# Patient Record
Sex: MALE | Race: WHITE | NOT HISPANIC OR LATINO | Employment: FULL TIME | URBAN - NONMETROPOLITAN AREA
[De-identification: names, ages, dates, MRNs, and addresses within clinical notes are randomized per-mention and may not be internally consistent; named-entity substitution may affect disease eponyms.]

---

## 2017-08-24 ENCOUNTER — APPOINTMENT (OUTPATIENT)
Dept: RADIOLOGY | Facility: MEDICAL CENTER | Age: 47
End: 2017-08-24
Payer: COMMERCIAL

## 2017-08-24 ENCOUNTER — TRANSCRIBE ORDERS (OUTPATIENT)
Dept: RADIOLOGY | Facility: MEDICAL CENTER | Age: 47
End: 2017-08-24

## 2017-08-24 DIAGNOSIS — S29.019S THORACIC MYOFASCIAL STRAIN, SEQUELA: ICD-10-CM

## 2017-08-24 DIAGNOSIS — M54.30 SCIATICA, UNSPECIFIED LATERALITY: ICD-10-CM

## 2017-08-24 DIAGNOSIS — M47.9 OSTEOARTHRITIS OF SPINE, UNSPECIFIED SPINAL OSTEOARTHRITIS COMPLICATION STATUS, UNSPECIFIED SPINAL REGION: Primary | ICD-10-CM

## 2017-08-24 DIAGNOSIS — M47.9 OSTEOARTHRITIS OF SPINE, UNSPECIFIED SPINAL OSTEOARTHRITIS COMPLICATION STATUS, UNSPECIFIED SPINAL REGION: ICD-10-CM

## 2017-08-24 PROCEDURE — 72110 X-RAY EXAM L-2 SPINE 4/>VWS: CPT

## 2017-08-24 PROCEDURE — 72072 X-RAY EXAM THORAC SPINE 3VWS: CPT

## 2021-01-04 ENCOUNTER — APPOINTMENT (OUTPATIENT)
Dept: RADIOLOGY | Facility: CLINIC | Age: 51
End: 2021-01-04
Payer: COMMERCIAL

## 2021-01-04 ENCOUNTER — OFFICE VISIT (OUTPATIENT)
Dept: URGENT CARE | Facility: CLINIC | Age: 51
End: 2021-01-04
Payer: COMMERCIAL

## 2021-01-04 VITALS
WEIGHT: 156 LBS | HEART RATE: 90 BPM | HEIGHT: 66 IN | SYSTOLIC BLOOD PRESSURE: 160 MMHG | BODY MASS INDEX: 25.07 KG/M2 | TEMPERATURE: 98 F | DIASTOLIC BLOOD PRESSURE: 80 MMHG

## 2021-01-04 DIAGNOSIS — M25.511 ACUTE PAIN OF RIGHT SHOULDER: ICD-10-CM

## 2021-01-04 DIAGNOSIS — M75.22 BICIPITAL TENDINITIS, LEFT SHOULDER: Primary | ICD-10-CM

## 2021-01-04 PROCEDURE — G0382 LEV 3 HOSP TYPE B ED VISIT: HCPCS | Performed by: PHYSICIAN ASSISTANT

## 2021-01-04 PROCEDURE — 73030 X-RAY EXAM OF SHOULDER: CPT

## 2021-01-04 RX ORDER — PREDNISONE 20 MG/1
TABLET ORAL
Qty: 15 TABLET | Refills: 0 | Status: SHIPPED | OUTPATIENT
Start: 2021-01-04

## 2021-01-04 NOTE — PROGRESS NOTES
[facility]        NAME: Donny Bales is a 48 y o  male  : 1970    MRN: [id number]  DATE: 2021  TIME: 9:29 AM    Assessment and Plan   Bicipital tendinitis, left shoulder [M75 22]  1  Bicipital tendinitis, left shoulder  predniSONE 20 mg tablet    Ambulatory referral to Orthopedic Surgery   2  Acute pain of right shoulder  XR shoulder 2+ vw left         Patient Instructions     Start Prednisone as prescribed  Do not take Motrin, Advil or Aleve as these can cause increase stomach problem when taking Prednisone  Take Tylenol for pain  Follow up with orthopedics if no improvement  Tendinitis   WHAT YOU NEED TO KNOW:   Tendinitis is painful inflammation or breakdown of your tendons  It may also be called tendinopathy  Tendinitis often occurs in the knee, shoulder, ankle, hip, or elbow  DISCHARGE INSTRUCTIONS:   Medicines:   · Pain medicines  such as acetaminophen or NSAIDs may decrease swelling and pain or fever  These medicines are available without a doctor's order  Ask which medicine to take  Ask how much to take and when to take it  Follow directions  Acetaminophen and NSAIDs can cause liver or kidney damage if not taken correctly  If you take blood thinner medicine, always ask your healthcare provider if NSAIDs are safe for you  Always read the medicine label and follow the directions on it before using these medicine  · Take your medicine as directed  Contact your healthcare provider if you think your medicine is not helping or if you have side effects  Tell him if you are allergic to any medicine  Keep a list of the medicines, vitamins, and herbs you take  Include the amounts, and when and why you take them  Bring the list or the pill bottles to follow-up visits  Carry your medicine list with you in case of an emergency      Management:   · Rest  your tendon as directed to help it heal  Ask your healthcare provider if you need to stop putting weight on your affected area     · Ice  helps decrease swelling and pain  Ice may also help prevent tissue damage  Use an ice pack, or put crushed ice in a plastic bag  Cover it with a towel and place it on the affected area for 10 to 15 minutes every hour or as directed  · Support devices  such as a cane, splint, shoe insert, or brace may help reduce your pain  · Physical therapy  may be ordered by your healthcare provider  This may be used to teach you exercises to help improve movement and strength, and to decrease pain  You may also learn how to improve your posture, and how to lift or exercise correctly  Prevention:   · Stretch and warm up  before you exercise  · Exercise regularly  to strengthen the muscles around your joint  Ease into an exercise routine for the first 3 weeks to prevent another injury  Ask your healthcare provider about the best exercise plan for you  Rest fully between activities  · Use the right equipment  for sports and exercise  Wear braces or tape around weak joints as directed  Follow up with your healthcare provider as directed:  Write down your questions so you remember to ask them during your visits  Contact your healthcare provider if:   · You have increased pain even after you take medicine  · You have questions or concerns about your condition or care  Return to the emergency department if:   · You have increased redness over the joint, or swelling in the joint  · You suddenly cannot move your joint  © Copyright [address] Information is for End User's use only and may not be sold, redistributed or otherwise used for commercial purposes  All illustrations and images included in CareNotes® are the copyrighted property of [employer]  or [facility]   The above information is an  only  It is not intended as medical advice for individual conditions or treatments   Talk to your doctor, nurse or pharmacist before following any medical regimen to see if it is safe and effective for you  Follow up with PCP in 3-5 days  Proceed to  ER if symptoms worsen  Chief Complaint     Chief Complaint   Patient presents with    Shoulder Pain     left shoulder pain for several weeks ,woke up today worse   denies trauma          History of Present Illness       Patient presents with left shoulder pain for the past several weeks  Yesterday he was moving a computer desk and felt a pop in his left shoulder  Today, he woke with increased pain  Review of Systems   Review of Systems   Constitutional: Negative for fever  Skin: Negative for rash  Neurological: Negative for weakness and numbness  Current Medications       Current Outpatient Medications:     predniSONE 20 mg tablet, 3 tabs daily x 3 day, 2 tabs daily x 2 days, 1 tab daily x 2 days, Disp: 15 tablet, Rfl: 0    Current Allergies     Allergies as of 01/04/2021    (No Known Allergies)            The following portions of the patient's history were reviewed and updated as appropriate: allergies, current medications, past family history, past medical history, past social history, past surgical history and problem list      History reviewed  No pertinent past medical history  Past Surgical History:   Procedure Laterality Date    APPENDECTOMY      HAND FRACTURE REPAIR         No family history on file  Medications have been verified  Objective   /80   Pulse 90   Temp 98 °F (36 7 °C)   Ht 5' 6" (1 676 m)   Wt 70 8 kg (156 lb)   BMI 25 18 kg/m²   No LMP for male patient  Physical Exam     Physical Exam  Vitals signs and nursing note reviewed  Constitutional:       Appearance: Normal appearance  HENT:      Head: Normocephalic and atraumatic  Cardiovascular:      Rate and Rhythm: Normal rate and regular rhythm  Pulmonary:      Effort: Pulmonary effort is normal    Musculoskeletal:      Comments: Left shoulder without any rashes erythema or swelling    There is tenderness over the left biceps tenderness  There is increased pain when trying to raise his arm above shoulder level  Negative empty can sign  Skin:     General: Skin is warm  Neurological:      Mental Status: He is alert         Left shoulder x-ray -  degenerative changes

## 2022-07-14 ENCOUNTER — OFFICE VISIT (OUTPATIENT)
Dept: URGENT CARE | Facility: MEDICAL CENTER | Age: 52
End: 2022-07-14
Payer: COMMERCIAL

## 2022-07-14 VITALS — TEMPERATURE: 98.1 F | RESPIRATION RATE: 17 BRPM | OXYGEN SATURATION: 100 % | HEART RATE: 90 BPM

## 2022-07-14 DIAGNOSIS — L03.113 CELLULITIS OF RIGHT ARM: Primary | ICD-10-CM

## 2022-07-14 PROCEDURE — G0382 LEV 3 HOSP TYPE B ED VISIT: HCPCS | Performed by: PHYSICIAN ASSISTANT

## 2022-07-14 RX ORDER — SULFAMETHOXAZOLE AND TRIMETHOPRIM 800; 160 MG/1; MG/1
1 TABLET ORAL EVERY 12 HOURS SCHEDULED
Qty: 10 TABLET | Refills: 0 | Status: SHIPPED | OUTPATIENT
Start: 2022-07-14 | End: 2022-07-19

## 2022-07-14 NOTE — PROGRESS NOTES
[facility]        NAME: Kurtis Morejon is a 46 y o  male  : 1970    MRN: [id number]  DATE: 2022  TIME: 8:38 AM    Assessment and Plan   Cellulitis of right arm [L03 113]  1  Cellulitis of right arm  sulfamethoxazole-trimethoprim (BACTRIM DS) 800-160 mg per tablet      - Bactrim as directed  - ER for spreading redness, swelling, fevers    Patient Instructions       Follow up with PCP in 3-5 days  Proceed to  ER if symptoms worsen  Chief Complaint     Chief Complaint   Patient presents with    Spider Bite         History of Present Illness       Patient is a 22-year-old male who presents for possible spider bite x3 days  States he 1st noticed it after he was trimming hedges  Reports that there has been spreading redness in the area but denies fevers, swelling, purulent drainage  Has been using antibiotic ointment and hydrogen peroxide      Review of Systems   Review of Systems   Constitutional: Negative for chills and fever  Musculoskeletal: Negative for joint swelling  Skin: Positive for color change and wound  Neurological: Negative for dizziness  Current Medications       Current Outpatient Medications:     sulfamethoxazole-trimethoprim (BACTRIM DS) 800-160 mg per tablet, Take 1 tablet by mouth every 12 (twelve) hours for 5 days, Disp: 10 tablet, Rfl: 0    predniSONE 20 mg tablet, 3 tabs daily x 3 day, 2 tabs daily x 2 days, 1 tab daily x 2 days, Disp: 15 tablet, Rfl: 0    Current Allergies     Allergies as of 2022    (No Known Allergies)            The following portions of the patient's history were reviewed and updated as appropriate: allergies, current medications, past family history, past medical history, past social history, past surgical history and problem list      No past medical history on file  Past Surgical History:   Procedure Laterality Date    APPENDECTOMY      HAND FRACTURE REPAIR         No family history on file        Medications have been verified  Objective   Pulse 90   Temp 98 1 °F (36 7 °C)   Resp 17   SpO2 100%        Physical Exam     Physical Exam  Constitutional:       General: He is not in acute distress  Appearance: He is not ill-appearing or toxic-appearing  Cardiovascular:      Rate and Rhythm: Normal rate  Pulmonary:      Effort: Pulmonary effort is normal    Skin:     Comments: Erythematous area noted to right forearm  Minimally tender to palpation  No edema or warmth or purulent drainage   Neurological:      Mental Status: He is alert     Psychiatric:         Mood and Affect: Mood normal          Behavior: Behavior normal

## 2022-07-14 NOTE — LETTER
July 14, 2022     Patient: Stefanie Alvarez   YOB: 1970   Date of Visit: 7/14/2022       To Whom it May Concern:    Gail Mathew was seen in my clinic on 7/14/2022  If you have any questions or concerns, please don't hesitate to call  Sincerely,          Va Basilio PA-C        CC: Mikaela Renteria

## 2023-02-01 ENCOUNTER — OFFICE VISIT (OUTPATIENT)
Dept: URGENT CARE | Facility: MEDICAL CENTER | Age: 53
End: 2023-02-01

## 2023-02-01 ENCOUNTER — HOSPITAL ENCOUNTER (EMERGENCY)
Facility: HOSPITAL | Age: 53
Discharge: HOME | End: 2023-02-01
Attending: EMERGENCY MEDICINE

## 2023-02-01 VITALS
DIASTOLIC BLOOD PRESSURE: 96 MMHG | SYSTOLIC BLOOD PRESSURE: 164 MMHG | RESPIRATION RATE: 18 BRPM | TEMPERATURE: 98.9 F | HEART RATE: 103 BPM | WEIGHT: 160 LBS | HEIGHT: 66 IN | BODY MASS INDEX: 25.71 KG/M2 | OXYGEN SATURATION: 99 %

## 2023-02-01 VITALS
OXYGEN SATURATION: 97 % | DIASTOLIC BLOOD PRESSURE: 82 MMHG | SYSTOLIC BLOOD PRESSURE: 172 MMHG | RESPIRATION RATE: 20 BRPM | TEMPERATURE: 99.4 F | HEART RATE: 87 BPM | WEIGHT: 160 LBS | BODY MASS INDEX: 25.82 KG/M2

## 2023-02-01 DIAGNOSIS — E86.0 DEHYDRATION: ICD-10-CM

## 2023-02-01 DIAGNOSIS — R03.0 ELEVATED BLOOD PRESSURE READING: ICD-10-CM

## 2023-02-01 DIAGNOSIS — A08.4 VIRAL GASTROENTERITIS: Primary | ICD-10-CM

## 2023-02-01 DIAGNOSIS — R19.7 DIARRHEA, UNSPECIFIED TYPE: Primary | ICD-10-CM

## 2023-02-01 DIAGNOSIS — R53.1 WEAKNESS: ICD-10-CM

## 2023-02-01 LAB
ALBUMIN SERPL BCP-MCNC: 4.7 G/DL (ref 3.5–5)
ALP SERPL-CCNC: 69 U/L (ref 34–104)
ALT SERPL W P-5'-P-CCNC: 33 U/L (ref 7–52)
ANION GAP SERPL CALCULATED.3IONS-SCNC: 13 MMOL/L (ref 4–13)
AST SERPL W P-5'-P-CCNC: 32 U/L (ref 13–39)
BACTERIA UR QL AUTO: NORMAL /HPF
BASOPHILS # BLD AUTO: 0.04 THOUSANDS/ÂΜL (ref 0–0.1)
BASOPHILS NFR BLD AUTO: 1 % (ref 0–1)
BILIRUB SERPL-MCNC: 0.41 MG/DL (ref 0.2–1)
BILIRUB UR QL STRIP: NEGATIVE
BUN SERPL-MCNC: 11 MG/DL (ref 5–25)
CALCIUM SERPL-MCNC: 9.1 MG/DL (ref 8.4–10.2)
CHLORIDE SERPL-SCNC: 96 MMOL/L (ref 96–108)
CLARITY UR: CLEAR
CO2 SERPL-SCNC: 22 MMOL/L (ref 21–32)
COLOR UR: YELLOW
CREAT SERPL-MCNC: 0.59 MG/DL (ref 0.6–1.3)
EOSINOPHIL # BLD AUTO: 0.22 THOUSAND/ÂΜL (ref 0–0.61)
EOSINOPHIL NFR BLD AUTO: 3 % (ref 0–6)
ERYTHROCYTE [DISTWIDTH] IN BLOOD BY AUTOMATED COUNT: 12.9 % (ref 11.6–15.1)
GFR SERPL CREATININE-BSD FRML MDRD: 116 ML/MIN/1.73SQ M
GLUCOSE SERPL-MCNC: 89 MG/DL (ref 65–140)
GLUCOSE UR STRIP-MCNC: NEGATIVE MG/DL
HCT VFR BLD AUTO: 47 % (ref 36.5–49.3)
HGB BLD-MCNC: 16.4 G/DL (ref 12–17)
HGB UR QL STRIP.AUTO: ABNORMAL
IMM GRANULOCYTES # BLD AUTO: 0.02 THOUSAND/UL (ref 0–0.2)
IMM GRANULOCYTES NFR BLD AUTO: 0 % (ref 0–2)
KETONES UR STRIP-MCNC: ABNORMAL MG/DL
LEUKOCYTE ESTERASE UR QL STRIP: NEGATIVE
LYMPHOCYTES # BLD AUTO: 2.72 THOUSANDS/ÂΜL (ref 0.6–4.47)
LYMPHOCYTES NFR BLD AUTO: 32 % (ref 14–44)
MCH RBC QN AUTO: 31.2 PG (ref 26.8–34.3)
MCHC RBC AUTO-ENTMCNC: 34.9 G/DL (ref 31.4–37.4)
MCV RBC AUTO: 89 FL (ref 82–98)
MONOCYTES # BLD AUTO: 0.91 THOUSAND/ÂΜL (ref 0.17–1.22)
MONOCYTES NFR BLD AUTO: 11 % (ref 4–12)
NEUTROPHILS # BLD AUTO: 4.67 THOUSANDS/ÂΜL (ref 1.85–7.62)
NEUTS SEG NFR BLD AUTO: 53 % (ref 43–75)
NITRITE UR QL STRIP: NEGATIVE
NON-SQ EPI CELLS URNS QL MICRO: NORMAL /HPF
NRBC BLD AUTO-RTO: 0 /100 WBCS
PH UR STRIP.AUTO: 6 [PH]
PLATELET # BLD AUTO: 331 THOUSANDS/UL (ref 149–390)
PMV BLD AUTO: 8.6 FL (ref 8.9–12.7)
POTASSIUM SERPL-SCNC: 4.1 MMOL/L (ref 3.5–5.3)
PROT SERPL-MCNC: 7.7 G/DL (ref 6.4–8.4)
PROT UR STRIP-MCNC: NEGATIVE MG/DL
RBC # BLD AUTO: 5.26 MILLION/UL (ref 3.88–5.62)
RBC #/AREA URNS AUTO: NORMAL /HPF
SODIUM SERPL-SCNC: 131 MMOL/L (ref 135–147)
SP GR UR STRIP.AUTO: 1.02 (ref 1–1.03)
UROBILINOGEN UR QL STRIP.AUTO: 0.2 E.U./DL
WBC # BLD AUTO: 8.58 THOUSAND/UL (ref 4.31–10.16)
WBC #/AREA URNS AUTO: NORMAL /HPF

## 2023-02-01 RX ORDER — ONDANSETRON 2 MG/ML
4 INJECTION INTRAMUSCULAR; INTRAVENOUS ONCE
Status: COMPLETED | OUTPATIENT
Start: 2023-02-01 | End: 2023-02-01

## 2023-02-01 RX ORDER — ONDANSETRON 4 MG/1
4 TABLET, ORALLY DISINTEGRATING ORAL EVERY 6 HOURS PRN
Qty: 20 TABLET | Refills: 0 | Status: SHIPPED | OUTPATIENT
Start: 2023-02-01

## 2023-02-01 RX ADMIN — SODIUM CHLORIDE 1000 ML: 0.9 INJECTION, SOLUTION INTRAVENOUS at 20:45

## 2023-02-01 RX ADMIN — SODIUM CHLORIDE 1000 ML: 0.9 INJECTION, SOLUTION INTRAVENOUS at 22:15

## 2023-02-01 RX ADMIN — ONDANSETRON HYDROCHLORIDE 4 MG: 2 INJECTION, SOLUTION INTRAVENOUS at 20:45

## 2023-02-01 RX ADMIN — SODIUM CHLORIDE 1000 ML: 0.9 INJECTION, SOLUTION INTRAVENOUS at 22:14

## 2023-02-01 NOTE — PROGRESS NOTES
[facility]        NAME: Diana Hernandez is a 46 y o  male  : 1970    MRN: [id number]  DATE: 2023  TIME: 6:32 PM    Assessment and Plan   Diarrhea, unspecified type [R19 7]  1  Diarrhea, unspecified type  Transfer to other facility      2  Weakness  Transfer to other facility          Referred patient to the Emergency Department for further evaluation  Patient Instructions       Proceed directly to the ED  Chief Complaint     Chief Complaint   Patient presents with   • Abdominal Pain     Abdominal pain that started on  with diarrhea    • Generalized Body Aches     That started on Monday with the chills          History of Present Illness       Patient is a 47 y/o/m presenting to [facility] with abdominal discomfort, nausea, diarrhea, chills and vomiting  Patient reports symptoms began about 3-4 days ago  Patient reports fatigue and weakness  Pt was able to tolerate PO today but reports about 12 episodes of diarrhea  Abdominal Pain  This is a new problem  The current episode started in the past 7 days  The onset quality is gradual  The problem occurs constantly  The pain is located in the generalized abdominal region  Associated symptoms include diarrhea, nausea and vomiting  Pertinent negatives include no arthralgias, dysuria, fever or hematuria  Generalized Body Aches  Associated symptoms include abdominal pain, diarrhea, nausea and vomiting  Pertinent negatives include no ear pain, eye pain, sore throat, fever, chest pain, coughing, shortness of breath or rash  Review of Systems   Review of Systems   Constitutional: Negative for chills and fever  HENT: Negative for ear pain and sore throat  Eyes: Negative for pain and visual disturbance  Respiratory: Negative for cough and shortness of breath  Cardiovascular: Negative for chest pain and palpitations  Gastrointestinal: Positive for abdominal pain, diarrhea, nausea and vomiting     Genitourinary: Negative for dysuria and hematuria  Musculoskeletal: Negative for arthralgias and back pain  Skin: Negative for color change and rash  Neurological: Negative for seizures and syncope  All other systems reviewed and are negative  Current Medications       Current Outpatient Medications:   •  predniSONE 20 mg tablet, 3 tabs daily x 3 day, 2 tabs daily x 2 days, 1 tab daily x 2 days, Disp: 15 tablet, Rfl: 0    Current Allergies     Allergies as of 02/01/2023   • (No Known Allergies)            The following portions of the patient's history were reviewed and updated as appropriate: allergies, current medications, past family history, past medical history, past social history, past surgical history and problem list      History reviewed  No pertinent past medical history  Past Surgical History:   Procedure Laterality Date   • APPENDECTOMY     • HAND FRACTURE REPAIR         History reviewed  No pertinent family history  Medications have been verified  Objective   /96   Pulse 103   Temp 98 9 °F (37 2 °C)   Resp 18   Ht 5' 6" (1 676 m)   Wt 72 6 kg (160 lb)   SpO2 99%   BMI 25 82 kg/m²   No LMP for male patient  Physical Exam     Physical Exam  Constitutional:       Appearance: Normal appearance  He is normal weight  He is ill-appearing  HENT:      Head: Normocephalic and atraumatic  Nose: Nose normal       Mouth/Throat:      Mouth: Mucous membranes are dry  Eyes:      Extraocular Movements: Extraocular movements intact  Conjunctiva/sclera: Conjunctivae normal       Pupils: Pupils are equal, round, and reactive to light  Cardiovascular:      Rate and Rhythm: Tachycardia present  Pulmonary:      Effort: Pulmonary effort is normal    Musculoskeletal:         General: Normal range of motion  Cervical back: Normal range of motion and neck supple  Skin:     General: Skin is warm and dry  Neurological:      General: No focal deficit present        Mental Status: He is alert and oriented to person, place, and time     Psychiatric:         Mood and Affect: Mood normal          Behavior: Behavior normal

## 2023-02-01 NOTE — Clinical Note
Rhea Danielle was seen and treated in our emergency department on 2/1/2023  Diagnosis:     Cassandra Player  may return to work on return date  He may return on this date: 02/03/2023         If you have any questions or concerns, please don't hesitate to call        Skylar Lucero DO    ______________________________           _______________          _______________  Hospital Representative                              Date                                Time

## 2023-02-01 NOTE — Clinical Note
Srinivsaa Ashford was seen and treated in our emergency department on 2/1/2023  Diagnosis:     Mary Ann Carroll  may return to work on return date  He may return on this date: 02/03/2023         If you have any questions or concerns, please don't hesitate to call        Linda March DO    ______________________________           _______________          _______________  Hospital Representative                              Date                                Time

## 2023-02-01 NOTE — Clinical Note
Noemylanilydia Yesenia was seen and treated in our emergency department on 2/1/2023  Diagnosis: gastroenteritis    Bill Swan  may return to work on return date  He may return on this date: 02/06/2023         If you have any questions or concerns, please don't hesitate to call        Rosalie Varner DO    ______________________________           _______________          _______________  Hospital Representative                              Date                                Time

## 2023-02-01 NOTE — Clinical Note
Janee Maldonado was seen and treated in our emergency department on 2/1/2023  Diagnosis:     Derick Adams  may return to work on return date  He may return on this date: 02/03/2023         If you have any questions or concerns, please don't hesitate to call        Liss Nunez DO    ______________________________           _______________          _______________  Hospital Representative                              Date                                Time

## 2023-02-02 NOTE — ED PROVIDER NOTES
History  Chief Complaint   Patient presents with   • Diarrhea     C/o diarrhea and nausea since Sunday  Denies any vomiting  Seen at care works today and told to come to ER  HPI    Prior to Admission Medications   Prescriptions Last Dose Informant Patient Reported? Taking?   predniSONE 20 mg tablet   No No   Sig: 3 tabs daily x 3 day, 2 tabs daily x 2 days, 1 tab daily x 2 days      Facility-Administered Medications: None       History reviewed  No pertinent past medical history  Past Surgical History:   Procedure Laterality Date   • APPENDECTOMY     • HAND FRACTURE REPAIR         History reviewed  No pertinent family history  I have reviewed and agree with the history as documented  E-Cigarette/Vaping   • E-Cigarette Use Never User      E-Cigarette/Vaping Substances     Social History     Tobacco Use   • Smoking status: Every Day   • Smokeless tobacco: Never   Vaping Use   • Vaping Use: Never used   Substance Use Topics   • Alcohol use:  Yes     Alcohol/week: 5 0 standard drinks     Types: 5 Cans of beer per week     Comment: daily   • Drug use: Never       Review of Systems    Physical Exam  Physical Exam    Vital Signs  ED Triage Vitals [02/01/23 1932]   Temperature Pulse Respirations Blood Pressure SpO2   99 4 °F (37 4 °C) 96 18 (!) 184/101 98 %      Temp Source Heart Rate Source Patient Position - Orthostatic VS BP Location FiO2 (%)   Tympanic Monitor Lying Left arm --      Pain Score       No Pain           Vitals:    02/01/23 1932 02/01/23 1947 02/01/23 2030 02/01/23 2115   BP: (!) 184/101 (!) 175/94 (!) 175/85 (!) 189/80   Pulse: 96  94 89   Patient Position - Orthostatic VS: Lying Lying Lying          Visual Acuity      ED Medications  Medications   sodium chloride 0 9 % bolus 1,000 mL (1,000 mL Intravenous New Bag 2/1/23 2045)   ondansetron (ZOFRAN) injection 4 mg (4 mg Intravenous Given 2/1/23 2045)       Diagnostic Studies  Results Reviewed     Procedure Component Value Units Date/Time Comprehensive metabolic panel [[id number]]  (Abnormal) Collected: 02/01/23 2048    Lab Status: Final result Specimen: Blood from Arm, Right Updated: 02/01/23 2135     Sodium 131 mmol/L      Potassium 4 1 mmol/L      Chloride 96 mmol/L      CO2 22 mmol/L      ANION GAP 13 mmol/L      BUN 11 mg/dL      Creatinine 0 59 mg/dL      Glucose 89 mg/dL      Calcium 9 1 mg/dL      AST 32 U/L      ALT 33 U/L      Alkaline Phosphatase 69 U/L      Total Protein 7 7 g/dL      Albumin 4 7 g/dL      Total Bilirubin 0 41 mg/dL      eGFR 116 ml/min/1 73sq m     Narrative:      [place] guidelines for Chronic Kidney Disease (CKD):   •  Stage 1 with normal or high GFR (GFR > 90 mL/min/1 73 square meters)  •  Stage 2 Mild CKD (GFR = 60-89 mL/min/1 73 square meters)  •  Stage 3A Moderate CKD (GFR = 45-59 mL/min/1 73 square meters)  •  Stage 3B Moderate CKD (GFR = 30-44 mL/min/1 73 square meters)  •  Stage 4 Severe CKD (GFR = 15-29 mL/min/1 73 square meters)  •  Stage 5 End Stage CKD (GFR <15 mL/min/1 73 square meters)  Note: GFR calculation is accurate only with a steady state creatinine    CBC and differential [[id number]]  (Abnormal) Collected: 02/01/23 2048    Lab Status: Final result Specimen: Blood from Arm, Right Updated: 02/01/23 2120     WBC 8 58 Thousand/uL      RBC 5 26 Million/uL      Hemoglobin 16 4 g/dL      Hematocrit 47 0 %      MCV 89 fL      MCH 31 2 pg      MCHC 34 9 g/dL      RDW 12 9 %      MPV 8 6 fL      Platelets 572 Thousands/uL      nRBC 0 /100 WBCs      Neutrophils Relative 53 %      Immat GRANS % 0 %      Lymphocytes Relative 32 %      Monocytes Relative 11 %      Eosinophils Relative 3 %      Basophils Relative 1 %      Neutrophils Absolute 4 67 Thousands/µL      Immature Grans Absolute 0 02 Thousand/uL      Lymphocytes Absolute 2 72 Thousands/µL      Monocytes Absolute 0 91 Thousand/µL      Eosinophils Absolute 0 22 Thousand/µL      Basophils Absolute 0 04 Thousands/µL No orders to display              Procedures  Procedures         ED Course                                             MDM    Disposition  Final diagnoses:   None     ED Disposition     None      Follow-up Information    None         Patient's Medications   Discharge Prescriptions    No medications on file       No discharge procedures on file      PDMP Review     None          ED Provider  Electronically Signed by /hpf      Bacteria, UA Occasional /hpf     UA w Reflex to Microscopic w Reflex to Culture [[id number]]  (Abnormal) Collected: 02/01/23 2219    Lab Status: Final result Specimen: Urine, Clean Catch Updated: 02/01/23 2243     Color, UA Yellow     Clarity, UA Clear     Specific Gravity, UA 1 025     pH, UA 6 0     Leukocytes, UA Negative     Nitrite, UA Negative     Protein, UA Negative mg/dl      Glucose, UA Negative mg/dl      Ketones, UA Trace mg/dl      Urobilinogen, UA 0 2 E U /dl      Bilirubin, UA Negative     Occult Blood, UA Small    Comprehensive metabolic panel [[id number]]  (Abnormal) Collected: 02/01/23 2048    Lab Status: Final result Specimen: Blood from Arm, Right Updated: 02/01/23 2135     Sodium 131 mmol/L      Potassium 4 1 mmol/L      Chloride 96 mmol/L      CO2 22 mmol/L      ANION GAP 13 mmol/L      BUN 11 mg/dL      Creatinine 0 59 mg/dL      Glucose 89 mg/dL      Calcium 9 1 mg/dL      AST 32 U/L      ALT 33 U/L      Alkaline Phosphatase 69 U/L      Total Protein 7 7 g/dL      Albumin 4 7 g/dL      Total Bilirubin 0 41 mg/dL      eGFR 116 ml/min/1 73sq m     Narrative:      [place] guidelines for Chronic Kidney Disease (CKD):   •  Stage 1 with normal or high GFR (GFR > 90 mL/min/1 73 square meters)  •  Stage 2 Mild CKD (GFR = 60-89 mL/min/1 73 square meters)  •  Stage 3A Moderate CKD (GFR = 45-59 mL/min/1 73 square meters)  •  Stage 3B Moderate CKD (GFR = 30-44 mL/min/1 73 square meters)  •  Stage 4 Severe CKD (GFR = 15-29 mL/min/1 73 square meters)  •  Stage 5 End Stage CKD (GFR <15 mL/min/1 73 square meters)  Note: GFR calculation is accurate only with a steady state creatinine    CBC and differential [[id number]]  (Abnormal) Collected: 02/01/23 2048    Lab Status: Final result Specimen: Blood from Arm, Right Updated: 02/01/23 2120     WBC 8 58 Thousand/uL      RBC 5 26 Million/uL      Hemoglobin 16 4 g/dL      Hematocrit 47 0 %      MCV 89 fL      MCH 31 2 pg      MCHC 34 9 g/dL      RDW 12 9 %      MPV 8 6 fL      Platelets 154 Thousands/uL      nRBC 0 /100 WBCs      Neutrophils Relative 53 %      Immat GRANS % 0 %      Lymphocytes Relative 32 %      Monocytes Relative 11 %      Eosinophils Relative 3 %      Basophils Relative 1 %      Neutrophils Absolute 4 67 Thousands/µL      Immature Grans Absolute 0 02 Thousand/uL      Lymphocytes Absolute 2 72 Thousands/µL      Monocytes Absolute 0 91 Thousand/µL      Eosinophils Absolute 0 22 Thousand/µL      Basophils Absolute 0 04 Thousands/µL                  No orders to display              Procedures  Procedures         ED Course                                             Medical Decision Making  No risk factors for c  difficile infection  No recent travel  No blood in the diarrhea  Hypertension is a new diagnoses and will require follow-up with his primary care physician  Patient understands    Dehydration: acute illness or injury  Viral gastroenteritis: acute illness or injury  Amount and/or Complexity of Data Reviewed  Independent Historian: spouse  External Data Reviewed: labs and notes  Details: Reviewed urgent care notes  Reviewed lab results indicating slight hyponatremia  Corrected with normal saline solution 2 L  Labs: ordered  Decision-making details documented in ED Course  Discussion of management or test interpretation with external provider(s): Reviewed previous records and previous vital signs    Risk  OTC drugs  Prescription drug management  Risk Details: Needs to follow-up with his primary care physician for elevated blood pressure  No signs or symptoms of endorgan damage at this time  Repeat abdominal exam again remains benign  Low suspicion for acute abdomen such as appendicitis, cholecystitis, colitis, diverticulitis  Reviewed instructions, follow-up information and return precautions with patient and spouse          Disposition  Final diagnoses:   Viral gastroenteritis   Dehydration   Elevated blood pressure reading     Time reflects when diagnosis was documented in both MDM as applicable and the Disposition within this note     Time User Action Codes Description Comment    2/1/2023  9:58 PM Melecio Gtz T Add [A08 4] Viral gastroenteritis     2/1/2023  9:58 PM So Horn T Add [E86 0] Dehydration     2/6/2023 11:08 AM So Horn Add [R03 0] Elevated blood pressure reading       ED Disposition     ED Disposition   Discharge    Condition   Stable    Date/Time   Wed Feb 1, 2023  9:58 PM    Comment   [facility] discharge to home/self care  Follow-up Information     Follow up With Specialties Details Why Contact Info Additional [address] For Urology [facility] Call  For re-evaluation [address] [phone]  [phone]  [facility], [address] [facility], [place], [place], [facility]          Discharge Medication List as of 2/1/2023 11:03 PM      START taking these medications    Details   ondansetron (Zofran ODT) 4 mg disintegrating tablet Take 1 tablet (4 mg total) by mouth every 6 (six) hours as needed for nausea or vomiting, Starting Wed 2/1/2023, Normal         CONTINUE these medications which have NOT CHANGED    Details   predniSONE 20 mg tablet 3 tabs daily x 3 day, 2 tabs daily x 2 days, 1 tab daily x 2 days, Normal             No discharge procedures on file      PDMP Review     None          ED Provider  Electronically Signed by           Lizett Nguyễn DO  02/06/23 0744

## 2023-10-30 ENCOUNTER — HOSPITAL ENCOUNTER (EMERGENCY)
Facility: HOSPITAL | Age: 53
Discharge: HOME | End: 2023-10-30
Attending: EMERGENCY MEDICINE
Payer: COMMERCIAL

## 2023-10-30 ENCOUNTER — APPOINTMENT (EMERGENCY)
Dept: CT IMAGING | Facility: HOSPITAL | Age: 53
End: 2023-10-30
Payer: COMMERCIAL

## 2023-10-30 VITALS
HEIGHT: 66 IN | OXYGEN SATURATION: 97 % | BODY MASS INDEX: 25.71 KG/M2 | SYSTOLIC BLOOD PRESSURE: 182 MMHG | TEMPERATURE: 97.8 F | WEIGHT: 160 LBS | DIASTOLIC BLOOD PRESSURE: 86 MMHG | HEART RATE: 88 BPM | RESPIRATION RATE: 18 BRPM

## 2023-10-30 DIAGNOSIS — S22.41XA CLOSED FRACTURE OF MULTIPLE RIBS OF RIGHT SIDE, INITIAL ENCOUNTER: Primary | ICD-10-CM

## 2023-10-30 DIAGNOSIS — J06.9 URI (UPPER RESPIRATORY INFECTION): ICD-10-CM

## 2023-10-30 DIAGNOSIS — R03.0 ELEVATED BP WITHOUT DIAGNOSIS OF HYPERTENSION: ICD-10-CM

## 2023-10-30 DIAGNOSIS — E87.1 HYPONATREMIA: ICD-10-CM

## 2023-10-30 DIAGNOSIS — R79.89 POSITIVE D DIMER: ICD-10-CM

## 2023-10-30 LAB
2HR DELTA HS TROPONIN: 1 NG/L
ALBUMIN SERPL BCP-MCNC: 4.7 G/DL (ref 3.5–5)
ALP SERPL-CCNC: 92 U/L (ref 34–104)
ALT SERPL W P-5'-P-CCNC: 23 U/L (ref 7–52)
ANION GAP SERPL CALCULATED.3IONS-SCNC: 11 MMOL/L
APTT PPP: 27 SECONDS (ref 23–37)
AST SERPL W P-5'-P-CCNC: 22 U/L (ref 13–39)
BILIRUB SERPL-MCNC: 0.68 MG/DL (ref 0.2–1)
BUN SERPL-MCNC: 12 MG/DL (ref 5–25)
CALCIUM SERPL-MCNC: 10 MG/DL (ref 8.4–10.2)
CARDIAC TROPONIN I PNL SERPL HS: 5 NG/L
CARDIAC TROPONIN I PNL SERPL HS: 6 NG/L
CHLORIDE SERPL-SCNC: 92 MMOL/L (ref 96–108)
CO2 SERPL-SCNC: 24 MMOL/L (ref 21–32)
CREAT SERPL-MCNC: 0.82 MG/DL (ref 0.6–1.3)
D DIMER PPP FEU-MCNC: 1.36 UG/ML FEU
FLUAV RNA RESP QL NAA+PROBE: NEGATIVE
FLUBV RNA RESP QL NAA+PROBE: NEGATIVE
GFR SERPL CREATININE-BSD FRML MDRD: 100 ML/MIN/1.73SQ M
GLUCOSE SERPL-MCNC: 160 MG/DL (ref 65–140)
INR PPP: 1.04 (ref 0.84–1.19)
POTASSIUM SERPL-SCNC: 3.9 MMOL/L (ref 3.5–5.3)
PROT SERPL-MCNC: 8 G/DL (ref 6.4–8.4)
PROTHROMBIN TIME: 13.5 SECONDS (ref 11.6–14.5)
RSV RNA RESP QL NAA+PROBE: NEGATIVE
SARS-COV-2 RNA RESP QL NAA+PROBE: NEGATIVE
SODIUM SERPL-SCNC: 127 MMOL/L (ref 135–147)

## 2023-10-30 PROCEDURE — 71275 CT ANGIOGRAPHY CHEST: CPT

## 2023-10-30 PROCEDURE — 99284 EMERGENCY DEPT VISIT MOD MDM: CPT

## 2023-10-30 PROCEDURE — 85027 COMPLETE CBC AUTOMATED: CPT | Performed by: PHYSICIAN ASSISTANT

## 2023-10-30 PROCEDURE — 96365 THER/PROPH/DIAG IV INF INIT: CPT

## 2023-10-30 PROCEDURE — 80053 COMPREHEN METABOLIC PANEL: CPT | Performed by: PHYSICIAN ASSISTANT

## 2023-10-30 PROCEDURE — 0241U HB NFCT DS VIR RESP RNA 4 TRGT: CPT | Performed by: PHYSICIAN ASSISTANT

## 2023-10-30 PROCEDURE — 96375 TX/PRO/DX INJ NEW DRUG ADDON: CPT

## 2023-10-30 PROCEDURE — 84484 ASSAY OF TROPONIN QUANT: CPT | Performed by: PHYSICIAN ASSISTANT

## 2023-10-30 PROCEDURE — 96361 HYDRATE IV INFUSION ADD-ON: CPT

## 2023-10-30 PROCEDURE — 36415 COLL VENOUS BLD VENIPUNCTURE: CPT | Performed by: PHYSICIAN ASSISTANT

## 2023-10-30 PROCEDURE — 85730 THROMBOPLASTIN TIME PARTIAL: CPT | Performed by: PHYSICIAN ASSISTANT

## 2023-10-30 PROCEDURE — 85007 BL SMEAR W/DIFF WBC COUNT: CPT | Performed by: PHYSICIAN ASSISTANT

## 2023-10-30 PROCEDURE — 85379 FIBRIN DEGRADATION QUANT: CPT | Performed by: PHYSICIAN ASSISTANT

## 2023-10-30 PROCEDURE — 93005 ELECTROCARDIOGRAM TRACING: CPT

## 2023-10-30 PROCEDURE — 85610 PROTHROMBIN TIME: CPT | Performed by: PHYSICIAN ASSISTANT

## 2023-10-30 PROCEDURE — G1004 CDSM NDSC: HCPCS

## 2023-10-30 PROCEDURE — 99285 EMERGENCY DEPT VISIT HI MDM: CPT | Performed by: PHYSICIAN ASSISTANT

## 2023-10-30 RX ORDER — ONDANSETRON 2 MG/ML
4 INJECTION INTRAMUSCULAR; INTRAVENOUS ONCE
Status: COMPLETED | OUTPATIENT
Start: 2023-10-30 | End: 2023-10-30

## 2023-10-30 RX ORDER — DOXYCYCLINE HYCLATE 100 MG/1
100 CAPSULE ORAL ONCE
Status: COMPLETED | OUTPATIENT
Start: 2023-10-30 | End: 2023-10-30

## 2023-10-30 RX ORDER — HYDROMORPHONE HCL/PF 1 MG/ML
0.5 SYRINGE (ML) INJECTION ONCE
Status: COMPLETED | OUTPATIENT
Start: 2023-10-30 | End: 2023-10-30

## 2023-10-30 RX ORDER — BENZONATATE 100 MG/1
100 CAPSULE ORAL 3 TIMES DAILY PRN
Qty: 21 CAPSULE | Refills: 0 | Status: SHIPPED | OUTPATIENT
Start: 2023-10-30

## 2023-10-30 RX ORDER — DOXYCYCLINE HYCLATE 100 MG/1
100 CAPSULE ORAL 2 TIMES DAILY
Qty: 14 CAPSULE | Refills: 0 | Status: SHIPPED | OUTPATIENT
Start: 2023-10-30 | End: 2023-11-06

## 2023-10-30 RX ORDER — OXYCODONE HYDROCHLORIDE AND ACETAMINOPHEN 5; 325 MG/1; MG/1
1 TABLET ORAL EVERY 4 HOURS PRN
Qty: 15 TABLET | Refills: 0 | Status: SHIPPED | OUTPATIENT
Start: 2023-10-30

## 2023-10-30 RX ORDER — DICLOFENAC SODIUM 75 MG/1
75 TABLET, DELAYED RELEASE ORAL 2 TIMES DAILY
Qty: 30 TABLET | Refills: 0 | Status: SHIPPED | OUTPATIENT
Start: 2023-10-30

## 2023-10-30 RX ORDER — OXYCODONE HYDROCHLORIDE AND ACETAMINOPHEN 5; 325 MG/1; MG/1
2 TABLET ORAL ONCE
Status: COMPLETED | OUTPATIENT
Start: 2023-10-30 | End: 2023-10-30

## 2023-10-30 RX ORDER — SODIUM CHLORIDE, SODIUM GLUCONATE, SODIUM ACETATE, POTASSIUM CHLORIDE, MAGNESIUM CHLORIDE, SODIUM PHOSPHATE, DIBASIC, AND POTASSIUM PHOSPHATE .53; .5; .37; .037; .03; .012; .00082 G/100ML; G/100ML; G/100ML; G/100ML; G/100ML; G/100ML; G/100ML
1000 INJECTION, SOLUTION INTRAVENOUS ONCE
Status: COMPLETED | OUTPATIENT
Start: 2023-10-30 | End: 2023-10-30

## 2023-10-30 RX ORDER — KETOROLAC TROMETHAMINE 30 MG/ML
15 INJECTION, SOLUTION INTRAMUSCULAR; INTRAVENOUS ONCE
Status: COMPLETED | OUTPATIENT
Start: 2023-10-30 | End: 2023-10-30

## 2023-10-30 RX ORDER — METHOCARBAMOL 500 MG/1
1000 TABLET, FILM COATED ORAL 2 TIMES DAILY PRN
Qty: 30 TABLET | Refills: 0 | Status: SHIPPED | OUTPATIENT
Start: 2023-10-30

## 2023-10-30 RX ORDER — LIDOCAINE 50 MG/G
1 PATCH TOPICAL ONCE
Status: DISCONTINUED | OUTPATIENT
Start: 2023-10-30 | End: 2023-10-30 | Stop reason: HOSPADM

## 2023-10-30 RX ORDER — METHOCARBAMOL 500 MG/1
500 TABLET, FILM COATED ORAL ONCE
Status: COMPLETED | OUTPATIENT
Start: 2023-10-30 | End: 2023-10-30

## 2023-10-30 RX ADMIN — METHOCARBAMOL 500 MG: 500 TABLET ORAL at 21:49

## 2023-10-30 RX ADMIN — HYDROMORPHONE HYDROCHLORIDE 0.5 MG: 1 INJECTION, SOLUTION INTRAMUSCULAR; INTRAVENOUS; SUBCUTANEOUS at 20:54

## 2023-10-30 RX ADMIN — LIDOCAINE 5% 1 PATCH: 700 PATCH TOPICAL at 21:50

## 2023-10-30 RX ADMIN — SODIUM CHLORIDE, SODIUM GLUCONATE, SODIUM ACETATE, POTASSIUM CHLORIDE, MAGNESIUM CHLORIDE, SODIUM PHOSPHATE, DIBASIC, AND POTASSIUM PHOSPHATE 1000 ML: .53; .5; .37; .037; .03; .012; .00082 INJECTION, SOLUTION INTRAVENOUS at 19:38

## 2023-10-30 RX ADMIN — DOXYCYCLINE HYCLATE 100 MG: 100 CAPSULE ORAL at 21:49

## 2023-10-30 RX ADMIN — SODIUM CHLORIDE 1000 ML: 0.9 INJECTION, SOLUTION INTRAVENOUS at 20:50

## 2023-10-30 RX ADMIN — IOHEXOL 85 ML: 350 INJECTION, SOLUTION INTRAVENOUS at 20:43

## 2023-10-30 RX ADMIN — ONDANSETRON 4 MG: 2 INJECTION INTRAMUSCULAR; INTRAVENOUS at 20:52

## 2023-10-30 RX ADMIN — OXYCODONE HYDROCHLORIDE AND ACETAMINOPHEN 2 TABLET: 5; 325 TABLET ORAL at 21:50

## 2023-10-30 RX ADMIN — KETOROLAC TROMETHAMINE 15 MG: 30 INJECTION, SOLUTION INTRAMUSCULAR at 19:38

## 2023-10-30 NOTE — ED PROVIDER NOTES
History  Chief Complaint   Patient presents with    Back Pain     Patient states he's getting over a cold and developed right mid back pain. Patient believes its from coughing too much     48year old male with no significant reported PMH presenting ambulatory from home with spouse for evaluation of right sided rib pain. Pt reports he has been dealing with a cold over the past week. He reports cough and congestion. He notes productive cough at times. He notes over the weekend he was coughing and then developed a severe pain in his posterior right ribs/flank region. Pt questions if he may have cracked a rib. He denies chest pain. He does not specifically report feeling short of breath but does note increased pain when taking a deep breath in. Hurts to cough, hurts to breath. Denies fever, chills. Denies N/V/D, abdominal pain. He has been eating/drinking but notes diminished appetite. No reported urinary complaints. No reported alleviating factors. No specific treatments tried. He reports he has been trying to increased fluids to prevent dehydration. No prior evaluation since onset. Denies sick contacts. No recent travel. No recent surgery. No recent immobilization. + tobacco use. No reported falls or specific trauma. History provided by:  Patient, medical records and spouse   used: No    Back Pain  Location:  Thoracic spine  Radiates to:  Does not radiate  Progression:  Unchanged  Chronicity:  New  Context: recent illness    Context: not falling and not recent injury    Relieved by:  Nothing  Worsened by:  Coughing and deep breathing  Associated symptoms: no abdominal pain, no chest pain, no dysuria, no fever, no headaches, no leg pain, no numbness, no paresthesias, no tingling and no weakness    Risk factors: no hx of cancer, no hx of osteoporosis and no recent surgery        Prior to Admission Medications   Prescriptions Last Dose Informant Patient Reported? Taking? ondansetron (Zofran ODT) 4 mg disintegrating tablet Not Taking  No No   Sig: Take 1 tablet (4 mg total) by mouth every 6 (six) hours as needed for nausea or vomiting   Patient not taking: Reported on 10/30/2023   predniSONE 20 mg tablet Not Taking  No No   Sig: 3 tabs daily x 3 day, 2 tabs daily x 2 days, 1 tab daily x 2 days   Patient not taking: Reported on 10/30/2023      Facility-Administered Medications: None       History reviewed. No pertinent past medical history. Past Surgical History:   Procedure Laterality Date    APPENDECTOMY      HAND FRACTURE REPAIR         History reviewed. No pertinent family history. I have reviewed and agree with the history as documented. E-Cigarette/Vaping    E-Cigarette Use Never User      E-Cigarette/Vaping Substances     Social History     Tobacco Use    Smoking status: Every Day     Packs/day: 1.00     Types: Cigarettes    Smokeless tobacco: Never   Vaping Use    Vaping Use: Never used   Substance Use Topics    Alcohol use: Yes     Alcohol/week: 5.0 standard drinks of alcohol     Types: 5 Cans of beer per week     Comment: daily    Drug use: Never       Review of Systems   Constitutional: Negative. Negative for chills, fatigue and fever. HENT:  Positive for congestion. Negative for rhinorrhea and sore throat. Eyes: Negative. Negative for visual disturbance. Respiratory:  Positive for cough and shortness of breath. Negative for wheezing. Cardiovascular: Negative. Negative for chest pain, palpitations and leg swelling. Gastrointestinal: Negative. Negative for abdominal pain, constipation, diarrhea, nausea and vomiting. Genitourinary:  Positive for flank pain. Negative for dysuria, frequency and hematuria. Musculoskeletal:  Positive for back pain. Negative for neck pain. Skin: Negative. Negative for rash. Neurological: Negative. Negative for dizziness, tingling, syncope, weakness, light-headedness, numbness, headaches and paresthesias. Psychiatric/Behavioral: Negative. All other systems reviewed and are negative. Physical Exam  Physical Exam  Vitals and nursing note reviewed. Constitutional:       General: He is awake. He is not in acute distress. Appearance: Normal appearance. He is well-developed. He is not toxic-appearing or diaphoretic. HENT:      Head: Normocephalic and atraumatic. Right Ear: Hearing and external ear normal.      Left Ear: Hearing and external ear normal.      Nose: Nose normal.      Mouth/Throat:      Mouth: Mucous membranes are moist.      Pharynx: Oropharynx is clear. Uvula midline. Eyes:      General: Lids are normal. No scleral icterus. Conjunctiva/sclera: Conjunctivae normal.   Neck:      Trachea: Trachea and phonation normal.   Cardiovascular:      Rate and Rhythm: Regular rhythm. Tachycardia present. Pulses: Normal pulses. Radial pulses are 2+ on the right side and 2+ on the left side. Heart sounds: Normal heart sounds, S1 normal and S2 normal. No murmur heard. Pulmonary:      Effort: Pulmonary effort is normal. No tachypnea or respiratory distress. Breath sounds: Normal breath sounds. No wheezing, rhonchi or rales. Comments: Pt has tenderness of right side as noted above. There is old bruising superior to this but pt does not have any noted tenderness in this region. Abdominal:      General: Bowel sounds are normal. There is no distension. Palpations: Abdomen is soft. Tenderness: There is no abdominal tenderness. There is no guarding or rebound. Musculoskeletal:      Cervical back: Normal range of motion and neck supple. Right lower leg: No edema. Left lower leg: No edema. Skin:     General: Skin is warm and dry. Capillary Refill: Capillary refill takes less than 2 seconds. Findings: Bruising present. No abrasion, laceration, rash or wound. Neurological:      General: No focal deficit present.       Mental Status: He is alert and oriented to person, place, and time. GCS: GCS eye subscore is 4. GCS verbal subscore is 5. GCS motor subscore is 6. Sensory: Sensation is intact. Motor: Motor function is intact. Gait: Gait normal.      Comments: Pt ambulatory to exam room in zone 2, steady gait. Psychiatric:         Mood and Affect: Mood normal.         Speech: Speech normal.         Behavior: Behavior normal. Behavior is cooperative.          Vital Signs  ED Triage Vitals [10/30/23 1913]   Temperature Pulse Respirations Blood Pressure SpO2   97.8 °F (36.6 °C) (!) 109 18 (!) 191/97 98 %      Temp Source Heart Rate Source Patient Position - Orthostatic VS BP Location FiO2 (%)   Temporal Monitor Sitting Right arm --      Pain Score       7           Vitals:    10/30/23 1913 10/30/23 2008   BP: (!) 191/97 (!) 182/86   Pulse: (!) 109 88   Patient Position - Orthostatic VS: Sitting          Visual Acuity      ED Medications  Medications   lidocaine (LIDODERM) 5 % patch 1 patch (1 patch Topical Medication Applied 10/30/23 2150)   ketorolac (TORADOL) injection 15 mg (15 mg Intravenous Given 10/30/23 1938)   multi-electrolyte (ISOLYTE-S PH 7.4) bolus 1,000 mL (0 mL Intravenous Stopped 10/30/23 2048)   sodium chloride 0.9 % bolus 1,000 mL (0 mL Intravenous Stopped 10/30/23 2149)   ondansetron (ZOFRAN) injection 4 mg (4 mg Intravenous Given 10/30/23 2052)   HYDROmorphone (DILAUDID) injection 0.5 mg (0.5 mg Intravenous Given 10/30/23 2054)   iohexol (OMNIPAQUE) 350 MG/ML injection (MULTI-DOSE) 85 mL (85 mL Intravenous Given 10/30/23 2043)   oxyCODONE-acetaminophen (PERCOCET) 5-325 mg per tablet 2 tablet (2 tablets Oral Given 10/30/23 2150)   methocarbamol (ROBAXIN) tablet 500 mg (500 mg Oral Given 10/30/23 2149)   doxycycline hyclate (VIBRAMYCIN) capsule 100 mg (100 mg Oral Given 10/30/23 2149)       Diagnostic Studies  Results Reviewed       Procedure Component Value Units Date/Time    HS Troponin I 2hr [[id number]]  (Normal) Collected: 10/30/23 2120    Lab Status: Final result Specimen: Blood from Arm, Right Updated: 10/30/23 2146     hs TnI 2hr 6 ng/L      Delta 2hr hsTnI 1 ng/L     RBC Morphology Reflex Test [[id number]] Collected: 10/30/23 1937    Lab Status: Final result Specimen: Blood from Arm, Right Updated: 10/30/23 2101    FLU/RSV/COVID - if FLU/RSV clinically relevant [[id number]]  (Normal) Collected: 10/30/23 1937    Lab Status: Final result Specimen: Nares from Nose Updated: 10/30/23 2027     SARS-CoV-2 Negative     INFLUENZA A PCR Negative     INFLUENZA B PCR Negative     RSV PCR Negative    Narrative:      FOR PEDIATRIC PATIENTS - copy/paste COVID Guidelines URL to browser: [email or web address]    SARS-CoV-2 assay is a Nucleic Acid Amplification assay intended for the  qualitative detection of nucleic acid from SARS-CoV-2 in nasopharyngeal  swabs. Results are for the presumptive identification of SARS-CoV-2 RNA. Positive results are indicative of infection with SARS-CoV-2, the virus  causing COVID-19, but do not rule out bacterial infection or co-infection  with other viruses. Laboratories within the [facility] and its  territories are required to report all positive results to the appropriate  public health authorities. Negative results do not preclude SARS-CoV-2  infection and should not be used as the sole basis for treatment or other  patient management decisions. Negative results must be combined with  clinical observations, patient history, and epidemiological information. This test has not been FDA cleared or approved. This test has been authorized by FDA under an Emergency Use Authorization  (EUA).  This test is only authorized for the duration of time the  declaration that circumstances exist justifying the authorization of the  emergency use of an in vitro diagnostic tests for detection of SARS-CoV-2  virus and/or diagnosis of COVID-19 infection under section 564(b)(1) of  the Act, 21 U. S.C. 521EHO-8(M)(4), unless the authorization is terminated  or revoked sooner. The test has been validated but independent review by FDA  and CLIA is pending. Test performed using [employer] GeneXpert: This RT-PCR assay targets N2,  a region unique to SARS-CoV-2. A conserved region in the E-gene was chosen  for pan-Sarbecovirus detection which includes SARS-CoV-2. According to CMS-2020-01-R, this platform meets the definition of high-throughput technology. CBC and differential [[id number]]  (Abnormal) Collected: 10/30/23 1937    Lab Status: Final result Specimen: Blood from Arm, Right Updated: 10/30/23 2014     WBC 11.63 Thousand/uL      RBC 5.17 Million/uL      Hemoglobin 16.1 g/dL      Hematocrit 45.9 %      MCV 89 fL      MCH 31.1 pg      MCHC 35.1 g/dL      RDW 12.3 %      MPV 8.6 fL      Platelets 394 Thousands/uL     Narrative: This is an appended report. These results have been appended to a previously verified report.     Manual Differential(PHLEBS Do Not Order) [[id number]]  (Abnormal) Collected: 10/30/23 1937    Lab Status: Final result Specimen: Blood from Arm, Right Updated: 10/30/23 2014     Segmented % 76 %      Lymphocytes % 16 %      Monocytes % 8 %      Eosinophils, % 0 %      Basophils % 0 %      Absolute Neutrophils 8.84 Thousand/uL      Lymphocytes Absolute 1.86 Thousand/uL      Monocytes Absolute 0.93 Thousand/uL      Eosinophils Absolute 0.00 Thousand/uL      Basophils Absolute 0.00 Thousand/uL      Total Counted --     RBC Morphology Normal     Platelet Estimate Adequate    HS Troponin I 4hr [[id number]]     Lab Status: No result Specimen: Blood     HS Troponin 0hr (reflex protocol) [[id number]]  (Normal) Collected: 10/30/23 1937    Lab Status: Final result Specimen: Blood from Arm, Right Updated: 10/30/23 2012     hs TnI 0hr 5 ng/L     Comprehensive metabolic panel [[id number]]  (Abnormal) Collected: 10/30/23 1937    Lab Status: Final result Specimen: Blood from Arm, Right Updated: 10/30/23 2005     Sodium 127 mmol/L      Potassium 3.9 mmol/L      Chloride 92 mmol/L      CO2 24 mmol/L      ANION GAP 11 mmol/L      BUN 12 mg/dL      Creatinine 0.82 mg/dL      Glucose 160 mg/dL      Calcium 10.0 mg/dL      AST 22 U/L      ALT 23 U/L      Alkaline Phosphatase 92 U/L      Total Protein 8.0 g/dL      Albumin 4.7 g/dL      Total Bilirubin 0.68 mg/dL      eGFR 100 ml/min/1.73sq m     Narrative:      [facility] (CKD):     Stage 1 with normal or high GFR (GFR > 90 mL/min/1.73 square meters)    Stage 2 Mild CKD (GFR = 60-89 mL/min/1.73 square meters)    Stage 3A Moderate CKD (GFR = 45-59 mL/min/1.73 square meters)    Stage 3B Moderate CKD (GFR = 30-44 mL/min/1.73 square meters)    Stage 4 Severe CKD (GFR = 15-29 mL/min/1.73 square meters)    Stage 5 End Stage CKD (GFR <15 mL/min/1.73 square meters)  Note: GFR calculation is accurate only with a steady state creatinine    D-Dimer [[id number]]  (Abnormal) Collected: 10/30/23 1937    Lab Status: Final result Specimen: Blood from Arm, Right Updated: 10/30/23 2004     D-Dimer, Quant 1.36 ug/ml FEU     Narrative: In the evaluation for possible pulmonary embolism, in the appropriate (Well's Score of 4 or less) patient, the age adjusted d-dimer cutoff for this patient can be calculated as:    Age x 0.01 (in ug/mL) for Age-adjusted D-dimer exclusion threshold for a patient over 50 years. Blease Mooring [[id number]]  (Normal) Collected: 10/30/23 1937    Lab Status: Final result Specimen: Blood from Arm, Right Updated: 10/30/23 2001     Protime 13.5 seconds      INR 1.04    APTT [[id number]]  (Normal) Collected: 10/30/23 1937    Lab Status: Final result Specimen: Blood from Arm, Right Updated: 10/30/23 2001     PTT 27 seconds                    CTA ED chest PE Study   Final Result by Debra Hernadez MD (10/30 2111)      1.   No acute pulmonary embolism or thoracic aortic aneurysm/dissection. 2.  Acute mildly displaced right posterior 9th, 10th, and 11th rib fractures are seen with adjacent small right pleural effusion and right basilar atelectasis. 3.  Scattered areas of subtle groundglass opacity in the right middle lobe, lingula, and left upper lobe may be due to small pulmonary contusions in the setting of trauma. Atypical infectious/inflammatory process of the lung parenchyma is also possible. Clinical correlation and follow-up imaging recommended to ensure resolution. 4.  Mild diffuse hepatic steatosis. Workstation performed: [id number]                    Procedures  ECG 12 Lead Documentation Only    Date/Time: 10/30/2023 7:40 PM    Performed by: Martha Salmeron PA-C  Authorized by: Martha Salmeron PA-C    Indications / Diagnosis:  HTN, tachycardia  ECG reviewed by me, the ED Provider: yes    Patient location:  ED  Previous ECG:     Previous ECG:  Unavailable    Comparison to cardiac monitor: Yes    Interpretation:     Interpretation: abnormal    Rate:     ECG rate:  100    ECG rate assessment: tachycardic    Rhythm:     Rhythm: sinus tachycardia    Ectopy:     Ectopy: none    QRS:     QRS axis:  Normal    QRS intervals:  Normal  Conduction:     Conduction: normal    ST segments:     ST segments:  Normal  T waves:     T waves: normal    Other findings:     Other findings: prolonged qTc interval    Comments:      , QRS 92, QT/QTc 392/505; no prior for comparison.            ED Course  ED Course as of 10/30/23 2210   Mon Oct 30, 2023   1950 WBC(!): 11.63  Previously normal nine months ago   1950 Hemoglobin: 16.1   1950 Platelet Count: 973   2007 POCT INR: 1.04   2007 PROTIME: 13.5   2007 PTT: 27   2007 Glucose, Random(!): 160   2007 Creatinine: 0.82   2007 BUN: 12   2007 Sodium(!): 127  Decreased from value of 131 nine months ago   2007 Potassium: 3.9   2007 Chloride(!): 92   2007 CO2: 24   2007 Anion Gap: 11   2007 Calcium: 10.0   2007 AST: 22   2007 ALT: 23   2007 Alkaline Phosphatase: 92   2007 Total Protein: 8.0   2008 Albumin: 4.7   2008 TOTAL BILIRUBIN: 0.68   2008 eGFR: 100   2008 D-Dimer, Quant(!): 1.36  Elevated, will pursue to further evaluate to rule out PE as well as other etiologies of his symptoms. 2013 hs TnI 0hr: 5  Not c/w ACS   2043 SARS-COV-2: Negative   2043 INFLU A PCR: Negative   2043 INFLU B PCR: Negative   2043 RSV PCR: Negative   2048 Pt returned from CT scan. Pt and spouse updated on results thus far. Pt is coughing more since scan. He reports pain feels about the same. No change after toradol. 2118 CTA ED chest PE Study  IMPRESSION:     1. No acute pulmonary embolism or thoracic aortic aneurysm/dissection. 2.  Acute mildly displaced right posterior 9th, 10th, and 11th rib fractures are seen with adjacent small right pleural effusion and right basilar atelectasis. 3.  Scattered areas of subtle groundglass opacity in the right middle lobe, lingula, and left upper lobe may be due to small pulmonary contusions in the setting of trauma. Atypical infectious/inflammatory process of the lung parenchyma is also possible. Clinical correlation and follow-up imaging recommended to ensure resolution. 4.  Mild diffuse hepatic steatosis. 2122 Pt and spouse updated on CT results. Offered admission for further symptom control which pt declines. He is maintaining normal oxygen saturations on room air, no respiratory distress. Will discharge with symptomatic management and strict return precautions. Anticipatory guidance provided, pt and spouse voiced understanding. 2146 Delta 2hr hsTnI: 1  Not c/w ACS       Discussed continued symptomatic/supportive care. Advised rest, fluids, OTC meds as needed for symptoms. Strict return precautions outlined. Advised outpatient follow up with PCP or return to ER for change in condition as outlined.  Pt and spouse verbalized understanding and had no further questions. HEART Risk Score      Flowsheet Row Most Recent Value   Heart Score Risk Calculator    History 0 Filed at: 10/30/2023 1929   ECG 0 Filed at: 10/30/2023 1929   Age 1 Filed at: 10/30/2023 1929   Risk Factors 1 Filed at: 10/30/2023 1929   Troponin 0 Filed at: 10/30/2023 1929   HEART Score 2 Filed at: 10/30/2023 1929                          SBIRT 22yo+      Flowsheet Row Most Recent Value   Initial Alcohol Screen: US AUDIT-C     1. How often do you have a drink containing alcohol? 6 Filed at: 10/30/2023 1912   2. How many drinks containing alcohol do you have on a typical day you are drinking? 4 Filed at: 10/30/2023 1912   3a. Male UNDER 65: How often do you have five or more drinks on one occasion? 6 Filed at: 10/30/2023 1912   Audit-C Score 16 Filed at: 10/30/2023 1912   Full Alcohol Screen: US AUDIT    4. How often during the last year have you found that you were not able to stop drinking once you had started? 0 Filed at: 10/30/2023 1912   5. How often during past year have you failed to do what was normally expected of you because of drinking? 0 Filed at: 10/30/2023 1912   6. How often in past year have you needed a first drink in the morning to get yourself going after a heavy drinking session? 0 Filed at: 10/30/2023 1912   7. How often in past year have you had feeling of guilt or remorse after drinking? 0 Filed at: 10/30/2023 1912   8. How often in past year have you been unable to remember what happened night before because you had been drinking? 0 Filed at: 10/30/2023 1912   9. Have you or someone else been injured as a result of your drinking? 0 Filed at: 10/30/2023 1912   10. Has a relative, friend, doctor or other health worker been concerned about your drinking and suggested you cut down?  0 Filed at: 10/30/2023 1912   AUDIT Total Score 16 Filed at: 10/30/2023 1912   MARY: How many times in the past year have you. ..     Used an illegal drug or used a prescription medication for non-medical reasons? Never Filed at: 10/30/2023 Ravinder Lea' Criteria for PE      Flowsheet Row Most Recent Value   Wells' Criteria for PE    Clinical signs and symptoms of DVT 0 Filed at: 10/30/2023 1929   PE is primary diagnosis or equally likely 0 Filed at: 10/30/2023 1929   HR >100 1.5 Filed at: 10/30/2023 1929   Immobilization at least 3 days or Surgery in the previous 4 weeks 0 Filed at: 10/30/2023 1929   Previous, objectively diagnosed PE or DVT 0 Filed at: 10/30/2023 1929   Hemoptysis 0 Filed at: 10/30/2023 1929   Malignancy with treatment within 6 months or palliative 0 Filed at: 10/30/2023 Martha Arroyo' Criteria Total 1.5 Filed at: 10/30/2023 1929                  Medical Decision Making  47 yo male presenting for evaluation of right sided rib pain. Pt notes recent respiratory illness and feels they are related. Will obtain EKG, labs, viral swab. Will pursue imaging based on lab results. Pt is noted to be hypertensive and mild tachycardic. Low risk by [place]', will screen with d dimer. Work up obtained as noted above. EKG and serial troponins not c/w ACS. Mild non specific leukocytosis, no anemia. No hypo or significant hyperglycemia. Renal function within normal limits. Noted to have hyponatremia, decreased from prior. D dimer elevated, Wells' low risk, CTA chest pursued to further evaluate. No noted PE. Has 3 rib fractures on right side in area of pain. Pt continues to deny any trauma or falls. Contributes to coughing. No oxygen requirements. Scattered groundglass opacities likely infectious in etiology given URI symptoms. Covid/flu/rsv negative. He was offered admission for further pain control and symptom management which he declined. Reviewed usual course and treatment of rib fractures. Pt was given incentive spirometer. Will also provide Rx's for pain control. Will cover with antibiotic for infection and tessalon for cough.   Referral placed to family practice as he does not currently have PCP. BP still remained mildly elevated, advised need to continue this and have it rechecked. Could be elevated secondary to acute pain/illness. HR improved with fluids. Pt felt pain adequately controlled to go home at his time. PDMP was queried, no suspicious behaviors. Sedation precautions reviewed and advised no driving and do not take with alcohol. 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 Strict return precautions outlined. Pt and spouse comfortable with plan of care. All questions answered. Please refer to above ER course for further details/discussion. Problems Addressed:  Closed fracture of multiple ribs of right side, initial encounter: acute illness or injury  Elevated BP without diagnosis of hypertension: acute illness or injury  Hyponatremia: acute illness or injury     Details: Recommended follow up with PCP for recheck. Positive D dimer: acute illness or injury     Details: CTA negative for PE  URI (upper respiratory infection): acute illness or injury    Amount and/or Complexity of Data Reviewed  Independent Historian: spouse  External Data Reviewed: labs and notes. Labs: ordered. Decision-making details documented in ED Course. Radiology: ordered and independent interpretation performed. Decision-making details documented in ED Course. ECG/medicine tests: ordered and independent interpretation performed. Decision-making details documented in ED Course. Discussion of management or test interpretation with external provider(s): attending    Risk  OTC drugs. Prescription drug management. Decision regarding hospitalization.              Disposition  Final diagnoses:   Closed fracture of multiple ribs of right side, initial encounter   URI (upper respiratory infection)   Hyponatremia   Positive D dimer   Elevated BP without diagnosis of hypertension     Time reflects when diagnosis was documented in both MDM as applicable and the Disposition within this note       Time User Action Codes Description Comment    10/30/2023  9:41 PM Adam Peppers Add [S22.41XA] Closed fracture of multiple ribs of right side, initial encounter     10/30/2023  9:41 PM Adam Peppers Add [J06.9] URI (upper respiratory infection)     10/30/2023  9:42 PM Adam Peppers Add [E87.1] Hyponatremia     10/30/2023  9:42 PM Adam Peppers Add [R79.89] Positive D dimer     10/30/2023  9:42 PM Adam Peppers Add [R03.0] Elevated BP without diagnosis of hypertension           ED Disposition       ED Disposition   Discharge    Condition   Stable    Date/Time   Mon Oct 30, 2023 2141    Comment   Kristi Contreras discharge to home/self care.                    Follow-up Information       Follow up With Specialties Details Why Contact Info Additional [address] [facility] Emergency Department Emergency Medicine  As needed [address] [place], [place] [id number] [place] [address] Emergency Department, [address] [place] [address], [id number]            Discharge Medication List as of 10/30/2023  9:48 PM        START taking these medications    Details   benzonatate (TESSALON PERLES) 100 mg capsule Take 1 capsule (100 mg total) by mouth 3 (three) times a day as needed for cough, Starting Mon 10/30/2023, Normal      diclofenac (VOLTAREN) 75 mg EC tablet Take 1 tablet (75 mg total) by mouth 2 (two) times a day Take with food., Starting Mon 10/30/2023, Normal      doxycycline hyclate (VIBRAMYCIN) 100 mg capsule Take 1 capsule (100 mg total) by mouth 2 (two) times a day for 7 days, Starting Mon 10/30/2023, Until Mon 11/6/2023, Normal      methocarbamol (ROBAXIN) 500 mg tablet Take 2 tablets (1,000 mg total) by mouth 2 (two) times a day as needed for muscle spasms, Starting Mon 10/30/2023, Normal      oxyCODONE-acetaminophen (Percocet) 5-325 mg per tablet Take 1 tablet by mouth every 4 (four) hours as needed for moderate pain or severe pain Initial Rx; dx: multiple rib fractures.  Max Daily Amount: 6 tablets, Starting Mon 10/30/2023, Normal           CONTINUE these medications which have NOT CHANGED    Details   ondansetron (Zofran ODT) 4 mg disintegrating tablet Take 1 tablet (4 mg total) by mouth every 6 (six) hours as needed for nausea or vomiting, Starting Wed 2/1/2023, Normal      predniSONE 20 mg tablet 3 tabs daily x 3 day, 2 tabs daily x 2 days, 1 tab daily x 2 days, Normal                 PDMP Review         Value Time User    PDMP Reviewed  Yes 10/30/2023  9:44 PM Martha Salmeron PA-C            ED Provider  Electronically Signed by             Martha Salmeron PA-C  10/30/23 1179

## 2023-10-30 NOTE — Clinical Note
Kike Borrego was seen and treated in our emergency department on 10/30/2023. Diagnosis:     Clay Franc  . He may return on this date: If you have any questions or concerns, please don't hesitate to call.       Sherry Appiah MD    ______________________________           _______________          _______________  Hospital Representative                              Date                                Time

## 2023-10-30 NOTE — Clinical Note
Kike Borrego was seen and treated in our emergency department on 10/30/2023. May return to work next week but light duty as available. No heavy lifting. Diagnosis: multiple rib fractures    Clay Valdez  . He may return on this date: 11/06/2023         If you have any questions or concerns, please don't hesitate to call.       Amara Rea PA-C    ______________________________           _______________          _______________  Hospital Representative                              Date                                Time

## 2023-10-31 LAB
ATRIAL RATE: 100 BPM
BASOPHILS # BLD MANUAL: 0 THOUSAND/UL (ref 0–0.1)
BASOPHILS NFR MAR MANUAL: 0 % (ref 0–1)
EOSINOPHIL # BLD MANUAL: 0 THOUSAND/UL (ref 0–0.4)
EOSINOPHIL NFR BLD MANUAL: 0 % (ref 0–6)
ERYTHROCYTE [DISTWIDTH] IN BLOOD BY AUTOMATED COUNT: 12.3 % (ref 11.6–15.1)
HCT VFR BLD AUTO: 45.9 % (ref 36.5–49.3)
HGB BLD-MCNC: 16.1 G/DL (ref 12–17)
LYMPHOCYTES # BLD AUTO: 1.86 THOUSAND/UL (ref 0.6–4.47)
LYMPHOCYTES # BLD AUTO: 16 % (ref 14–44)
MCH RBC QN AUTO: 31.1 PG (ref 26.8–34.3)
MCHC RBC AUTO-ENTMCNC: 35.1 G/DL (ref 31.4–37.4)
MCV RBC AUTO: 89 FL (ref 82–98)
MONOCYTES # BLD AUTO: 0.93 THOUSAND/UL (ref 0–1.22)
MONOCYTES NFR BLD: 8 % (ref 4–12)
NEUTROPHILS # BLD MANUAL: 8.84 THOUSAND/UL (ref 1.85–7.62)
NEUTS SEG NFR BLD AUTO: 76 % (ref 43–75)
P AXIS: 67 DEGREES
PATHOLOGY REVIEW: YES
PLATELET # BLD AUTO: 370 THOUSANDS/UL (ref 149–390)
PLATELET BLD QL SMEAR: ADEQUATE
PMV BLD AUTO: 8.6 FL (ref 8.9–12.7)
PR INTERVAL: 150 MS
QRS AXIS: 64 DEGREES
QRSD INTERVAL: 92 MS
QT INTERVAL: 392 MS
QTC INTERVAL: 505 MS
RBC # BLD AUTO: 5.17 MILLION/UL (ref 3.88–5.62)
RBC MORPH BLD: NORMAL
T WAVE AXIS: 50 DEGREES
VENTRICULAR RATE: 100 BPM
WBC # BLD AUTO: 11.63 THOUSAND/UL (ref 4.31–10.16)

## 2023-10-31 PROCEDURE — 93010 ELECTROCARDIOGRAM REPORT: CPT | Performed by: INTERNAL MEDICINE

## 2023-10-31 NOTE — DISCHARGE INSTRUCTIONS
Alternate ice/heat, topical muscle rubs, lidocaine patches, etc.  Take anti-inflammatory as directed with food. Caution sedation with muscle relaxant and pain medication. No driving while taking. Do not take with alcohol. Take antibiotic as directed for the full duration. Tessalon every 8 hours as needed for coughing. Follow up with primary care provider for re-evaluation or return to ER as needed. You should have your sodium level recheck as well as monitoring of your blood pressure. Return if persistent/worsening pain, trouble breathing, fever or any other concerns.

## 2023-11-08 ENCOUNTER — OFFICE VISIT (OUTPATIENT)
Dept: FAMILY MEDICINE CLINIC | Facility: CLINIC | Age: 53
End: 2023-11-08
Payer: COMMERCIAL

## 2023-11-08 VITALS
BODY MASS INDEX: 25.43 KG/M2 | DIASTOLIC BLOOD PRESSURE: 92 MMHG | OXYGEN SATURATION: 98 % | HEIGHT: 66 IN | SYSTOLIC BLOOD PRESSURE: 158 MMHG | HEART RATE: 88 BPM | WEIGHT: 158.2 LBS

## 2023-11-08 DIAGNOSIS — Z12.5 SCREENING FOR PROSTATE CANCER: ICD-10-CM

## 2023-11-08 DIAGNOSIS — I10 PRIMARY HYPERTENSION: ICD-10-CM

## 2023-11-08 DIAGNOSIS — E87.1 HYPONATREMIA: Primary | ICD-10-CM

## 2023-11-08 DIAGNOSIS — Z12.11 SCREENING FOR COLON CANCER: ICD-10-CM

## 2023-11-08 DIAGNOSIS — Z13.220 SCREENING FOR HYPERLIPIDEMIA: ICD-10-CM

## 2023-11-08 DIAGNOSIS — R73.09 ELEVATED GLUCOSE LEVEL: ICD-10-CM

## 2023-11-08 DIAGNOSIS — S22.41XD CLOSED FRACTURE OF MULTIPLE RIBS OF RIGHT SIDE WITH ROUTINE HEALING, SUBSEQUENT ENCOUNTER: ICD-10-CM

## 2023-11-08 DIAGNOSIS — Z72.0 TOBACCO ABUSE: ICD-10-CM

## 2023-11-08 PROBLEM — R03.0 ELEVATED BP WITHOUT DIAGNOSIS OF HYPERTENSION: Status: ACTIVE | Noted: 2023-11-08

## 2023-11-08 PROBLEM — S22.41XA MULTIPLE CLOSED FRACTURES OF RIBS OF RIGHT SIDE: Status: ACTIVE | Noted: 2023-11-08

## 2023-11-08 PROCEDURE — 99204 OFFICE O/P NEW MOD 45 MIN: CPT | Performed by: PHYSICIAN ASSISTANT

## 2023-11-08 RX ORDER — LISINOPRIL 20 MG/1
20 TABLET ORAL DAILY
Qty: 30 TABLET | Refills: 0 | Status: SHIPPED | OUTPATIENT
Start: 2023-11-08

## 2023-11-08 RX ORDER — IBUPROFEN 200 MG
TABLET ORAL EVERY 6 HOURS PRN
COMMUNITY

## 2023-11-08 NOTE — PROGRESS NOTES
Name: Gregory Johnson      : 1970      MRN: [id number]  Encounter Provider: Allie Santo PA-C  Encounter Date: 2023   Encounter department: [address]     1. Hyponatremia  Assessment & Plan:  I believe this is due to excessive water intake. Recommended that patient try to restrict fluid intake to 64 ounces per day. We will repeat labs. Orders:  -     Ambulatory Referral to Family Practice  -     Comprehensive metabolic panel; Future  -     CBC and differential; Future    2. Primary hypertension  Assessment & Plan:  Blood pressure elevated at today's follow-up visit. We will start lisinopril 20 mg daily. Return in 2 weeks for blood pressure check. Orders:  -     Ambulatory Referral to Family Practice  -     lisinopril (ZESTRIL) 20 mg tablet; Take 1 tablet (20 mg total) by mouth daily    3. Elevated glucose level  Assessment & Plan:  Repeat fasting glucose and hemoglobin A1c to evaluate    Orders:  -     Comprehensive metabolic panel; Future  -     HEMOGLOBIN A1C W/ EAG ESTIMATION; Future    4. Closed fracture of multiple ribs of right side with routine healing, subsequent encounter  Assessment & Plan:  Pain is improving. May continue ibuprofen as needed. We will repeat chest x-ray in 2 weeks prior to follow-up appointment. Gave patient lifting restriction of 10 pounds until follow-up appointment. Orders:  -     XR ribs right w pa chest min 3 views; Future; Expected date: 2023    5. Screening for colon cancer  -     Cologuard    6. Screening for prostate cancer  -     PSA, Total Screen; Future    7. Screening for hyperlipidemia  -     Lipid panel; Future    8. Tobacco abuse  Assessment & Plan:  Discussed smoking cessation. Patient admits that he is cutting back. We will discuss further at follow-up appointment. Depression Screening and Follow-up Plan: Patient was screened for depression during today's encounter.  They screened negative with a PHQ-2 score of 0. Roxana Weaver is a very pleasant 66-year-old male who is here today to establish care accompanied by his wife. He has not been to a doctor since he was a teenager. He was recently evaluated in the emergency room for right-sided rib pain. It was found that he had fractures of the posterior right 9th, 10th, and 11th ribs. He admits that the pain has been improving. He is only taking ibuprofen as needed for pain. While in the emergency room, his blood pressure was elevated. He has not ever been prescribed blood pressure medication. He was also found to have hyponatremia. He admits that he drinks a lot of water throughout the day. He would like to get up-to-date with routine labs. He denies any other concerns at this time. Review of Systems   Constitutional:  Negative for chills, diaphoresis, fatigue and fever. HENT:  Negative for congestion, ear pain, postnasal drip, rhinorrhea, sneezing, sore throat and trouble swallowing. Eyes:  Negative for pain and visual disturbance. Respiratory:  Negative for apnea, cough, shortness of breath and wheezing. Cardiovascular:  Negative for chest pain and palpitations. Gastrointestinal:  Negative for abdominal pain, constipation, diarrhea, nausea and vomiting. Genitourinary:  Negative for dysuria. Musculoskeletal:  Negative for arthralgias, gait problem and myalgias. Neurological:  Negative for dizziness, syncope, weakness, light-headedness, numbness and headaches. Psychiatric/Behavioral:  Negative for suicidal ideas. The patient is not nervous/anxious.         Current Outpatient Medications on File Prior to Visit   Medication Sig   • ibuprofen (MOTRIN) 200 mg tablet Take by mouth every 6 (six) hours as needed for mild pain   • [DISCONTINUED] benzonatate (TESSALON PERLES) 100 mg capsule Take 1 capsule (100 mg total) by mouth 3 (three) times a day as needed for cough (Patient not taking: Reported on 11/8/2023)   • [DISCONTINUED] diclofenac (VOLTAREN) 75 mg EC tablet Take 1 tablet (75 mg total) by mouth 2 (two) times a day Take with food. (Patient not taking: Reported on 11/8/2023)   • [DISCONTINUED] methocarbamol (ROBAXIN) 500 mg tablet Take 2 tablets (1,000 mg total) by mouth 2 (two) times a day as needed for muscle spasms (Patient not taking: Reported on 11/8/2023)   • [DISCONTINUED] ondansetron (Zofran ODT) 4 mg disintegrating tablet Take 1 tablet (4 mg total) by mouth every 6 (six) hours as needed for nausea or vomiting (Patient not taking: Reported on 10/30/2023)   • [DISCONTINUED] oxyCODONE-acetaminophen (Percocet) 5-325 mg per tablet Take 1 tablet by mouth every 4 (four) hours as needed for moderate pain or severe pain Initial Rx; dx: multiple rib fractures. Max Daily Amount: 6 tablets (Patient not taking: Reported on 11/8/2023)   • [DISCONTINUED] predniSONE 20 mg tablet 3 tabs daily x 3 day, 2 tabs daily x 2 days, 1 tab daily x 2 days (Patient not taking: Reported on 10/30/2023)       Objective     /92   Pulse 88   Ht 5' 6" (1.676 m)   Wt 71.8 kg (158 lb 3.2 oz)   SpO2 98%   BMI 25.53 kg/m²     Physical Exam  Vitals and nursing note reviewed. Constitutional:       Appearance: He is well-developed. HENT:      Head: Normocephalic and atraumatic. Right Ear: Tympanic membrane, ear canal and external ear normal.      Left Ear: Tympanic membrane, ear canal and external ear normal.      Nose: Nose normal.      Mouth/Throat:      Pharynx: No oropharyngeal exudate or posterior oropharyngeal erythema. Eyes:      Pupils: Pupils are equal, round, and reactive to light. Cardiovascular:      Rate and Rhythm: Normal rate and regular rhythm. Heart sounds: Normal heart sounds. No murmur heard. No friction rub. No gallop. Pulmonary:      Effort: Pulmonary effort is normal. No respiratory distress. Breath sounds: Normal breath sounds. No wheezing or rales.    Chest:      Chest wall: Tenderness (Mild tenderness over right lateral and posterior 9-11th ribs) present. Musculoskeletal:         General: Normal range of motion. Cervical back: Normal range of motion and neck supple. Lymphadenopathy:      Cervical: No cervical adenopathy. Skin:     General: Skin is warm and dry. Neurological:      Mental Status: He is alert and oriented to person, place, and time. Psychiatric:         Behavior: Behavior normal.         Thought Content:  Thought content normal.         Judgment: Judgment normal.       Angélica Godlen PA-C

## 2023-11-08 NOTE — ASSESSMENT & PLAN NOTE
I believe this is due to excessive water intake. Recommended that patient try to restrict fluid intake to 64 ounces per day. We will repeat labs.

## 2023-11-08 NOTE — ASSESSMENT & PLAN NOTE
Pain is improving. May continue ibuprofen as needed. We will repeat chest x-ray in 2 weeks prior to follow-up appointment. Gave patient lifting restriction of 10 pounds until follow-up appointment.

## 2023-11-08 NOTE — ASSESSMENT & PLAN NOTE
Blood pressure elevated at today's follow-up visit. We will start lisinopril 20 mg daily. Return in 2 weeks for blood pressure check.

## 2023-11-08 NOTE — LETTER
November 8, 2023     Patient: Sybil Chi  YOB: 1970  Date of Visit: 11/8/2023      To Whom it May Concern:    Mellissa Larry is under my professional care. Juancarlos Mendoza was seen in my office on 11/8/2023. Juancarlos Mendoza may return to work on 11/9/23. He should have a lifting restriction of 10 lbs until he is reevaluated in our office on 11/24/23. If you have any questions or concerns, please don't hesitate to call.          Sincerely,            Nancy Pardo PA-C

## 2023-11-08 NOTE — ASSESSMENT & PLAN NOTE
Discussed smoking cessation. Patient admits that he is cutting back. We will discuss further at follow-up appointment.

## 2023-11-17 ENCOUNTER — RA CDI HCC (OUTPATIENT)
Dept: OTHER | Facility: HOSPITAL | Age: 53
End: 2023-11-17

## 2023-11-17 NOTE — PROGRESS NOTES
[address] [facility] coding opportunities       Chart reviewed, no opportunity found: [address] [facility]        Patients Insurance     Medicare Insurance: [employer]

## 2023-11-18 ENCOUNTER — APPOINTMENT (OUTPATIENT)
Dept: LAB | Facility: MEDICAL CENTER | Age: 53
End: 2023-11-18
Payer: COMMERCIAL

## 2023-11-18 ENCOUNTER — APPOINTMENT (OUTPATIENT)
Dept: RADIOLOGY | Facility: MEDICAL CENTER | Age: 53
End: 2023-11-18
Payer: COMMERCIAL

## 2023-11-18 DIAGNOSIS — R73.09 ELEVATED GLUCOSE LEVEL: ICD-10-CM

## 2023-11-18 DIAGNOSIS — E87.1 HYPONATREMIA: ICD-10-CM

## 2023-11-18 DIAGNOSIS — Z13.220 SCREENING FOR HYPERLIPIDEMIA: ICD-10-CM

## 2023-11-18 DIAGNOSIS — Z12.5 SCREENING FOR PROSTATE CANCER: ICD-10-CM

## 2023-11-18 DIAGNOSIS — S22.41XD CLOSED FRACTURE OF MULTIPLE RIBS OF RIGHT SIDE WITH ROUTINE HEALING, SUBSEQUENT ENCOUNTER: ICD-10-CM

## 2023-11-18 LAB
ALBUMIN SERPL BCP-MCNC: 4.5 G/DL (ref 3.5–5)
ALP SERPL-CCNC: 87 U/L (ref 34–104)
ALT SERPL W P-5'-P-CCNC: 20 U/L (ref 7–52)
ANION GAP SERPL CALCULATED.3IONS-SCNC: 10 MMOL/L
AST SERPL W P-5'-P-CCNC: 20 U/L (ref 13–39)
BASOPHILS # BLD AUTO: 0.08 THOUSANDS/ÂΜL (ref 0–0.1)
BASOPHILS NFR BLD AUTO: 1 % (ref 0–1)
BILIRUB SERPL-MCNC: 0.5 MG/DL (ref 0.2–1)
BUN SERPL-MCNC: 8 MG/DL (ref 5–25)
CALCIUM SERPL-MCNC: 10.3 MG/DL (ref 8.4–10.2)
CHLORIDE SERPL-SCNC: 97 MMOL/L (ref 96–108)
CHOLEST SERPL-MCNC: 191 MG/DL
CO2 SERPL-SCNC: 24 MMOL/L (ref 21–32)
CREAT SERPL-MCNC: 0.56 MG/DL (ref 0.6–1.3)
EOSINOPHIL # BLD AUTO: 0.42 THOUSAND/ÂΜL (ref 0–0.61)
EOSINOPHIL NFR BLD AUTO: 4 % (ref 0–6)
ERYTHROCYTE [DISTWIDTH] IN BLOOD BY AUTOMATED COUNT: 12.6 % (ref 11.6–15.1)
EST. AVERAGE GLUCOSE BLD GHB EST-MCNC: 131 MG/DL
GFR SERPL CREATININE-BSD FRML MDRD: 118 ML/MIN/1.73SQ M
GLUCOSE P FAST SERPL-MCNC: 88 MG/DL (ref 65–99)
HBA1C MFR BLD: 6.2 %
HCT VFR BLD AUTO: 44.3 % (ref 36.5–49.3)
HDLC SERPL-MCNC: 60 MG/DL
HGB BLD-MCNC: 14.8 G/DL (ref 12–17)
IMM GRANULOCYTES # BLD AUTO: 0.04 THOUSAND/UL (ref 0–0.2)
IMM GRANULOCYTES NFR BLD AUTO: 0 % (ref 0–2)
LDLC SERPL CALC-MCNC: 123 MG/DL (ref 0–100)
LYMPHOCYTES # BLD AUTO: 2.43 THOUSANDS/ÂΜL (ref 0.6–4.47)
LYMPHOCYTES NFR BLD AUTO: 22 % (ref 14–44)
MCH RBC QN AUTO: 30.3 PG (ref 26.8–34.3)
MCHC RBC AUTO-ENTMCNC: 33.4 G/DL (ref 31.4–37.4)
MCV RBC AUTO: 91 FL (ref 82–98)
MONOCYTES # BLD AUTO: 0.98 THOUSAND/ÂΜL (ref 0.17–1.22)
MONOCYTES NFR BLD AUTO: 9 % (ref 4–12)
NEUTROPHILS # BLD AUTO: 6.91 THOUSANDS/ÂΜL (ref 1.85–7.62)
NEUTS SEG NFR BLD AUTO: 64 % (ref 43–75)
NONHDLC SERPL-MCNC: 131 MG/DL
NRBC BLD AUTO-RTO: 0 /100 WBCS
PLATELET # BLD AUTO: 467 THOUSANDS/UL (ref 149–390)
PMV BLD AUTO: 8.8 FL (ref 8.9–12.7)
POTASSIUM SERPL-SCNC: 4.8 MMOL/L (ref 3.5–5.3)
PROT SERPL-MCNC: 7.6 G/DL (ref 6.4–8.4)
PSA SERPL-MCNC: 1.05 NG/ML (ref 0–4)
RBC # BLD AUTO: 4.88 MILLION/UL (ref 3.88–5.62)
SODIUM SERPL-SCNC: 131 MMOL/L (ref 135–147)
TRIGL SERPL-MCNC: 38 MG/DL
WBC # BLD AUTO: 10.86 THOUSAND/UL (ref 4.31–10.16)

## 2023-11-18 PROCEDURE — 80061 LIPID PANEL: CPT

## 2023-11-18 PROCEDURE — 36415 COLL VENOUS BLD VENIPUNCTURE: CPT

## 2023-11-18 PROCEDURE — G0103 PSA SCREENING: HCPCS

## 2023-11-18 PROCEDURE — 85025 COMPLETE CBC W/AUTO DIFF WBC: CPT

## 2023-11-18 PROCEDURE — 71101 X-RAY EXAM UNILAT RIBS/CHEST: CPT

## 2023-11-18 PROCEDURE — 80053 COMPREHEN METABOLIC PANEL: CPT

## 2023-11-18 PROCEDURE — 83036 HEMOGLOBIN GLYCOSYLATED A1C: CPT

## 2023-11-24 ENCOUNTER — OFFICE VISIT (OUTPATIENT)
Dept: FAMILY MEDICINE CLINIC | Facility: CLINIC | Age: 53
End: 2023-11-24
Payer: COMMERCIAL

## 2023-11-24 VITALS
BODY MASS INDEX: 24.94 KG/M2 | SYSTOLIC BLOOD PRESSURE: 142 MMHG | WEIGHT: 155.2 LBS | TEMPERATURE: 98.2 F | OXYGEN SATURATION: 98 % | DIASTOLIC BLOOD PRESSURE: 88 MMHG | HEART RATE: 103 BPM | HEIGHT: 66 IN

## 2023-11-24 DIAGNOSIS — E87.1 HYPONATREMIA: ICD-10-CM

## 2023-11-24 DIAGNOSIS — S22.41XD CLOSED FRACTURE OF MULTIPLE RIBS OF RIGHT SIDE WITH ROUTINE HEALING, SUBSEQUENT ENCOUNTER: ICD-10-CM

## 2023-11-24 DIAGNOSIS — E83.52 SERUM CALCIUM ELEVATED: ICD-10-CM

## 2023-11-24 DIAGNOSIS — I10 PRIMARY HYPERTENSION: Primary | ICD-10-CM

## 2023-11-24 DIAGNOSIS — R73.09 ELEVATED GLUCOSE LEVEL: ICD-10-CM

## 2023-11-24 PROCEDURE — 99214 OFFICE O/P EST MOD 30 MIN: CPT | Performed by: PHYSICIAN ASSISTANT

## 2023-11-24 RX ORDER — LISINOPRIL 40 MG/1
40 TABLET ORAL DAILY
Qty: 30 TABLET | Refills: 2 | Status: SHIPPED | OUTPATIENT
Start: 2023-11-24

## 2023-11-24 NOTE — ASSESSMENT & PLAN NOTE
A1c is elevated at 6.2. Recommended that he try to cut back on sugary drinks and carbs in diet. We will repeat in 3 months.

## 2023-11-24 NOTE — ASSESSMENT & PLAN NOTE
Improved with the fluid restriction. Recommended that he continue to restrict his fluid to about 64 ounces per day. We will recheck labs again in 3 months.

## 2023-11-24 NOTE — ASSESSMENT & PLAN NOTE
Mildly elevated at 10.3. Possibly due to healing rib fractures. We will repeat and check a PTH level.

## 2023-11-24 NOTE — ASSESSMENT & PLAN NOTE
Pain has improved significantly. May continue ibuprofen as needed. Chest x-ray shows stable rib fractures. Patient may increase lifting restriction to 15 pounds. If pain has resolved, may return to work with no restrictions in 3 more weeks.

## 2023-11-24 NOTE — ASSESSMENT & PLAN NOTE
Increase lisinopril from 20 mg to 40 mg. He will continue to monitor blood pressures at home.   Return in 1 month for a blood pressure check

## 2023-11-24 NOTE — PROGRESS NOTES
Name: Marie Miguel      : 1970      MRN: [id number]  Encounter Provider: Carroll Cook PA-C  Encounter Date: 2023   Encounter department: [address]     1. Primary hypertension  Assessment & Plan:  Increase lisinopril from 20 mg to 40 mg. He will continue to monitor blood pressures at home. Return in 1 month for a blood pressure check    Orders:  -     CBC and differential; Future  -     Comprehensive metabolic panel; Future  -     lisinopril (ZESTRIL) 40 mg tablet; Take 1 tablet (40 mg total) by mouth daily    2. Hyponatremia  Assessment & Plan:  Improved with the fluid restriction. Recommended that he continue to restrict his fluid to about 64 ounces per day. We will recheck labs again in 3 months. Orders:  -     Comprehensive metabolic panel; Future    3. Elevated glucose level  Assessment & Plan:  A1c is elevated at 6.2. Recommended that he try to cut back on sugary drinks and carbs in diet. We will repeat in 3 months. Orders:  -     HEMOGLOBIN A1C W/ EAG ESTIMATION; Future    4. Closed fracture of multiple ribs of right side with routine healing, subsequent encounter  Assessment & Plan:  Pain has improved significantly. May continue ibuprofen as needed. Chest x-ray shows stable rib fractures. Patient may increase lifting restriction to 15 pounds. If pain has resolved, may return to work with no restrictions in 3 more weeks. 5. Serum calcium elevated  Assessment & Plan:  Mildly elevated at 10.3. Possibly due to healing rib fractures. We will repeat and check a PTH level. Orders:  -     Comprehensive metabolic panel; Future  -     PTH, intact; Future        Depression Screening and Follow-up Plan: Patient was screened for depression during today's encounter. They screened negative with a PHQ-2 score of 0. 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   He admits that he is doing well since starting the lisinopril. His blood pressure has been running 270-118 systolic over 28-82 diastolic. He denies any side effects from the blood pressure medication. He did complete his blood work, which she would like to review today. He admits that his rib pain has improved significantly. He denies any coughing, shortness of breath, or wheezing. Review of Systems   Constitutional:  Negative for chills, diaphoresis, fatigue and fever. HENT:  Negative for congestion, ear pain, postnasal drip, rhinorrhea, sneezing, sore throat and trouble swallowing. Eyes:  Negative for pain and visual disturbance. Respiratory:  Negative for apnea, cough, shortness of breath and wheezing. Cardiovascular:  Negative for chest pain and palpitations. Gastrointestinal:  Negative for abdominal pain, constipation, diarrhea, nausea and vomiting. Genitourinary:  Negative for dysuria and hematuria. Musculoskeletal:  Positive for arthralgias (right sided rib pain). Negative for gait problem and myalgias. Neurological:  Negative for dizziness, syncope, weakness, light-headedness, numbness and headaches. Psychiatric/Behavioral:  Negative for suicidal ideas. The patient is not nervous/anxious. Current Outpatient Medications on File Prior to Visit   Medication Sig   • [DISCONTINUED] lisinopril (ZESTRIL) 20 mg tablet Take 1 tablet (20 mg total) by mouth daily   • ibuprofen (MOTRIN) 200 mg tablet Take by mouth every 6 (six) hours as needed for mild pain (Patient not taking: Reported on 11/24/2023)       Objective     /88   Pulse 103   Temp 98.2 °F (36.8 °C)   Ht 5' 6" (1.676 m)   Wt 70.4 kg (155 lb 3.2 oz)   SpO2 98%   BMI 25.05 kg/m²     Physical Exam  Vitals and nursing note reviewed. Constitutional:       Appearance: He is well-developed. HENT:      Head: Normocephalic and atraumatic.       Right Ear: Tympanic membrane, ear canal and external ear normal.      Left Ear: Tympanic membrane, ear canal and external ear normal.      Nose: Nose normal.      Mouth/Throat:      Pharynx: No oropharyngeal exudate or posterior oropharyngeal erythema. Eyes:      Pupils: Pupils are equal, round, and reactive to light. Cardiovascular:      Rate and Rhythm: Normal rate and regular rhythm. Heart sounds: Normal heart sounds. No murmur heard. No friction rub. No gallop. Pulmonary:      Effort: Pulmonary effort is normal. No respiratory distress. Breath sounds: Normal breath sounds. No wheezing or rales. Comments: Mild tenderness over right lateral and posterior ribs. No crepitus. Musculoskeletal:         General: Normal range of motion. Cervical back: Normal range of motion and neck supple. Lymphadenopathy:      Cervical: No cervical adenopathy. Skin:     General: Skin is warm and dry. Neurological:      Mental Status: He is alert and oriented to person, place, and time. Psychiatric:         Behavior: Behavior normal.         Thought Content:  Thought content normal.         Judgment: Judgment normal.       Marilu Car PA-C

## 2023-11-24 NOTE — LETTER
November 24, 2023     Patient: Cornelius Roper  YOB: 1970  Date of Visit: 11/24/2023      To Whom it May Concern:    Saintclair Herder is under my professional care. Lolly Anderson was seen in my office on 11/24/2023. Lolly Anderson may return to work on 11/27/23. He should have a lifting restriction of 15 lbs until 12/18/2023. He may return to full duty on 12/18/2023 without restrictions. If you have any questions or concerns, please don't hesitate to call.          Sincerely,            Steph Preciado PA-C

## 2023-12-19 ENCOUNTER — TELEPHONE (OUTPATIENT)
Dept: FAMILY MEDICINE CLINIC | Facility: CLINIC | Age: 53
End: 2023-12-19

## 2023-12-19 DIAGNOSIS — I10 PRIMARY HYPERTENSION: ICD-10-CM

## 2023-12-19 NOTE — TELEPHONE ENCOUNTER
Pt presents in office for a b/p reading   Took 2x's   1- 156/83  2-160/88    Pt states at home it jumps around any where from 110/70 to 140/??    Pt states he does not feel dizzy but he feels like his head is in the clouds at times especially when it is low. (110/70)     Pt denies h/a SOB or dizziness

## 2023-12-20 RX ORDER — LISINOPRIL 30 MG/1
30 TABLET ORAL DAILY
Qty: 30 TABLET | Refills: 1 | Status: SHIPPED | OUTPATIENT
Start: 2023-12-20 | End: 2023-12-28 | Stop reason: SDUPTHER

## 2023-12-20 NOTE — TELEPHONE ENCOUNTER
BP is much better at home.    I am going to decrease his BP medication to 30 mg daily. I would like him to check his BP daily and come back in 2 weeks for a BP check.

## 2023-12-28 DIAGNOSIS — I10 PRIMARY HYPERTENSION: ICD-10-CM

## 2023-12-28 RX ORDER — LISINOPRIL 30 MG/1
30 TABLET ORAL DAILY
Qty: 30 TABLET | Refills: 1 | Status: SHIPPED | OUTPATIENT
Start: 2023-12-28

## 2024-01-27 DIAGNOSIS — I10 PRIMARY HYPERTENSION: ICD-10-CM

## 2024-01-29 RX ORDER — LISINOPRIL 30 MG/1
TABLET ORAL
Qty: 30 TABLET | Refills: 1 | Status: SHIPPED | OUTPATIENT
Start: 2024-01-29

## 2024-02-17 ENCOUNTER — APPOINTMENT (OUTPATIENT)
Dept: LAB | Facility: MEDICAL CENTER | Age: 54
End: 2024-02-17
Payer: COMMERCIAL

## 2024-02-17 DIAGNOSIS — R73.09 ELEVATED GLUCOSE LEVEL: ICD-10-CM

## 2024-02-17 DIAGNOSIS — I10 PRIMARY HYPERTENSION: ICD-10-CM

## 2024-02-17 DIAGNOSIS — E87.1 HYPONATREMIA: ICD-10-CM

## 2024-02-17 DIAGNOSIS — E83.52 SERUM CALCIUM ELEVATED: ICD-10-CM

## 2024-02-17 LAB
ALBUMIN SERPL BCP-MCNC: 4.5 G/DL (ref 3.5–5)
ALP SERPL-CCNC: 71 U/L (ref 34–104)
ALT SERPL W P-5'-P-CCNC: 29 U/L (ref 7–52)
ANION GAP SERPL CALCULATED.3IONS-SCNC: 7 MMOL/L
AST SERPL W P-5'-P-CCNC: 25 U/L (ref 13–39)
BASOPHILS # BLD AUTO: 0.04 THOUSANDS/ÂΜL (ref 0–0.1)
BASOPHILS NFR BLD AUTO: 1 % (ref 0–1)
BILIRUB SERPL-MCNC: 0.32 MG/DL (ref 0.2–1)
BUN SERPL-MCNC: 10 MG/DL (ref 5–25)
CALCIUM SERPL-MCNC: 8.9 MG/DL (ref 8.4–10.2)
CHLORIDE SERPL-SCNC: 97 MMOL/L (ref 96–108)
CO2 SERPL-SCNC: 24 MMOL/L (ref 21–32)
CREAT SERPL-MCNC: 0.56 MG/DL (ref 0.6–1.3)
EOSINOPHIL # BLD AUTO: 0.09 THOUSAND/ÂΜL (ref 0–0.61)
EOSINOPHIL NFR BLD AUTO: 2 % (ref 0–6)
ERYTHROCYTE [DISTWIDTH] IN BLOOD BY AUTOMATED COUNT: 14.1 % (ref 11.6–15.1)
EST. AVERAGE GLUCOSE BLD GHB EST-MCNC: 123 MG/DL
GFR SERPL CREATININE-BSD FRML MDRD: 118 ML/MIN/1.73SQ M
GLUCOSE P FAST SERPL-MCNC: 103 MG/DL (ref 65–99)
HBA1C MFR BLD: 5.9 %
HCT VFR BLD AUTO: 39.1 % (ref 36.5–49.3)
HGB BLD-MCNC: 13.5 G/DL (ref 12–17)
IMM GRANULOCYTES # BLD AUTO: 0.01 THOUSAND/UL (ref 0–0.2)
IMM GRANULOCYTES NFR BLD AUTO: 0 % (ref 0–2)
LYMPHOCYTES # BLD AUTO: 1.81 THOUSANDS/ÂΜL (ref 0.6–4.47)
LYMPHOCYTES NFR BLD AUTO: 39 % (ref 14–44)
MCH RBC QN AUTO: 30.4 PG (ref 26.8–34.3)
MCHC RBC AUTO-ENTMCNC: 34.5 G/DL (ref 31.4–37.4)
MCV RBC AUTO: 88 FL (ref 82–98)
MONOCYTES # BLD AUTO: 0.73 THOUSAND/ÂΜL (ref 0.17–1.22)
MONOCYTES NFR BLD AUTO: 16 % (ref 4–12)
NEUTROPHILS # BLD AUTO: 1.96 THOUSANDS/ÂΜL (ref 1.85–7.62)
NEUTS SEG NFR BLD AUTO: 42 % (ref 43–75)
NRBC BLD AUTO-RTO: 0 /100 WBCS
PLATELET # BLD AUTO: 322 THOUSANDS/UL (ref 149–390)
PMV BLD AUTO: 8.9 FL (ref 8.9–12.7)
POTASSIUM SERPL-SCNC: 4.1 MMOL/L (ref 3.5–5.3)
PROT SERPL-MCNC: 7.2 G/DL (ref 6.4–8.4)
PTH-INTACT SERPL-MCNC: 49.1 PG/ML (ref 12–88)
RBC # BLD AUTO: 4.44 MILLION/UL (ref 3.88–5.62)
SODIUM SERPL-SCNC: 128 MMOL/L (ref 135–147)
WBC # BLD AUTO: 4.64 THOUSAND/UL (ref 4.31–10.16)

## 2024-02-17 PROCEDURE — 83036 HEMOGLOBIN GLYCOSYLATED A1C: CPT

## 2024-02-17 PROCEDURE — 85025 COMPLETE CBC W/AUTO DIFF WBC: CPT

## 2024-02-17 PROCEDURE — 80053 COMPREHEN METABOLIC PANEL: CPT

## 2024-02-17 PROCEDURE — 36415 COLL VENOUS BLD VENIPUNCTURE: CPT

## 2024-02-17 PROCEDURE — 83970 ASSAY OF PARATHORMONE: CPT

## 2024-02-20 DIAGNOSIS — E87.1 HYPONATREMIA: Primary | ICD-10-CM

## 2024-02-26 ENCOUNTER — OFFICE VISIT (OUTPATIENT)
Dept: FAMILY MEDICINE CLINIC | Facility: CLINIC | Age: 54
End: 2024-02-26
Payer: COMMERCIAL

## 2024-02-26 VITALS
DIASTOLIC BLOOD PRESSURE: 92 MMHG | OXYGEN SATURATION: 98 % | HEART RATE: 75 BPM | BODY MASS INDEX: 25.17 KG/M2 | HEIGHT: 66 IN | WEIGHT: 156.6 LBS | SYSTOLIC BLOOD PRESSURE: 142 MMHG

## 2024-02-26 DIAGNOSIS — E87.1 HYPONATREMIA: ICD-10-CM

## 2024-02-26 DIAGNOSIS — R73.09 ELEVATED GLUCOSE LEVEL: Primary | ICD-10-CM

## 2024-02-26 DIAGNOSIS — I10 PRIMARY HYPERTENSION: ICD-10-CM

## 2024-02-26 PROCEDURE — 99214 OFFICE O/P EST MOD 30 MIN: CPT | Performed by: PHYSICIAN ASSISTANT

## 2024-02-26 RX ORDER — LISINOPRIL 30 MG/1
30 TABLET ORAL DAILY
Qty: 90 TABLET | Refills: 1 | Status: SHIPPED | OUTPATIENT
Start: 2024-02-26

## 2024-02-27 NOTE — PROGRESS NOTES
"Name: Buzz Renteria      : 1970      MRN: [id number]  Encounter Provider: Layla Trujillo PA-C  Encounter Date: 2024   Encounter department: [facility]    Assessment & Plan     1. Elevated glucose level  Assessment & Plan:  A1c improved to 5.9.  Recommended that he continue to watch diet.  Will recheck A1c in 3 months.    Orders:  -     Hemoglobin A1C; Future; Expected date: 2024    2. Primary hypertension  Assessment & Plan:  Blood pressure in office is elevated compared to home readings.  I believe this is because patient is nervous.  Systolic readings are 120s to 130s and diastolic readings are 80s at home.  Continue lisinopril 30 mg daily.    Orders:  -     lisinopril (ZESTRIL) 30 mg tablet; Take 1 tablet (30 mg total) by mouth daily    3. Hyponatremia  Assessment & Plan:  Patient has not been compliant with fluid restriction.  Reviewed labs with patient.  Recommended that he try to return to fluid restrictions of about 64 ounces per day.  Patient has an appointment upcoming with nephrology in March.  I believe this is contributing to symptoms.  He will get repeat labs prior to appointment with nephrology.    Orders:  -     Comprehensive metabolic panel; Future           Subjective      Buzz is a pleasant 53-year-old male who is here today accompanied by his wife for a 3-month follow-up.  He admits that his blood pressures have been well-controlled at home.  He admits that his systolic blood pressure has been 120s to 130s and diastolic has been 80s to 90s.  He is feeling much better since decreasing the blood pressure medication from 40 mg to 30 mg.  However, he admits that over the past few weeks he has been feeling \" spaced out\" and lightheaded at times.  He admits that he has not been compliant with fluid restrictions for hyponatremia.  He admits that he drinks a glass of iced tea in the morning, at least 8 glasses of water throughout the day, and approximately 6 beers at night.  " "He admits that he did feel better when he was watching his fluid intake.  He is scheduled to see nephrology next month.  He admits that he has been trying to watch his diet.      Review of Systems   Constitutional:  Negative for chills, diaphoresis, fatigue and fever.   HENT:  Negative for congestion, ear pain, postnasal drip, rhinorrhea, sneezing, sore throat and trouble swallowing.    Eyes:  Negative for pain and visual disturbance.   Respiratory:  Negative for apnea, cough, shortness of breath and wheezing.    Cardiovascular:  Negative for chest pain and palpitations.   Gastrointestinal:  Negative for abdominal pain, constipation, diarrhea, nausea and vomiting.   Genitourinary:  Negative for dysuria.   Musculoskeletal:  Negative for arthralgias, gait problem and myalgias.   Neurological:  Positive for light-headedness. Negative for dizziness, syncope, weakness, numbness and headaches.   Psychiatric/Behavioral:  Negative for suicidal ideas. The patient is not nervous/anxious.        Current Outpatient Medications on File Prior to Visit   Medication Sig   • [DISCONTINUED] lisinopril (ZESTRIL) 30 mg tablet TAKE ONE (1) TABLET (30 MG TOTAL) BY MOUTH DAILY   • [DISCONTINUED] ibuprofen (MOTRIN) 200 mg tablet Take by mouth every 6 (six) hours as needed for mild pain (Patient not taking: Reported on 11/24/2023)       Objective     /92   Pulse 75   Ht 5' 6\" (1.676 m)   Wt 71 kg (156 lb 9.6 oz)   SpO2 98%   BMI 25.28 kg/m²     Physical Exam  Vitals and nursing note reviewed.   Constitutional:       Appearance: He is well-developed.   HENT:      Head: Normocephalic and atraumatic.      Right Ear: External ear normal.      Left Ear: External ear normal.      Nose: Nose normal.      Mouth/Throat:      Pharynx: No oropharyngeal exudate or posterior oropharyngeal erythema.   Eyes:      Extraocular Movements: Extraocular movements intact.   Cardiovascular:      Rate and Rhythm: Normal rate and regular rhythm.      " Heart sounds: Normal heart sounds. No murmur heard.     No friction rub. No gallop.   Pulmonary:      Effort: Pulmonary effort is normal. No respiratory distress.      Breath sounds: Normal breath sounds. No wheezing or rales.   Musculoskeletal:         General: Normal range of motion.      Cervical back: Normal range of motion and neck supple.   Skin:     General: Skin is warm and dry.   Neurological:      Mental Status: He is alert and oriented to person, place, and time.   Psychiatric:         Behavior: Behavior normal.         Thought Content: Thought content normal.         Judgment: Judgment normal.       Layla Trujillo PA-C

## 2024-02-27 NOTE — ASSESSMENT & PLAN NOTE
Patient has not been compliant with fluid restriction.  Reviewed labs with patient.  Recommended that he try to return to fluid restrictions of about 64 ounces per day.  Patient has an appointment upcoming with nephrology in March.  I believe this is contributing to symptoms.  He will get repeat labs prior to appointment with nephrology.

## 2024-02-27 NOTE — ASSESSMENT & PLAN NOTE
Blood pressure in office is elevated compared to home readings.  I believe this is because patient is nervous.  Systolic readings are 120s to 130s and diastolic readings are 80s at home.  Continue lisinopril 30 mg daily.

## 2024-03-23 ENCOUNTER — APPOINTMENT (OUTPATIENT)
Dept: LAB | Facility: MEDICAL CENTER | Age: 54
End: 2024-03-23
Payer: COMMERCIAL

## 2024-03-23 DIAGNOSIS — E87.1 HYPONATREMIA: ICD-10-CM

## 2024-03-23 LAB
ALBUMIN SERPL BCP-MCNC: 4.5 G/DL (ref 3.5–5)
ALP SERPL-CCNC: 60 U/L (ref 34–104)
ALT SERPL W P-5'-P-CCNC: 20 U/L (ref 7–52)
ANION GAP SERPL CALCULATED.3IONS-SCNC: 9 MMOL/L (ref 4–13)
AST SERPL W P-5'-P-CCNC: 20 U/L (ref 13–39)
BILIRUB SERPL-MCNC: 0.42 MG/DL (ref 0.2–1)
BUN SERPL-MCNC: 12 MG/DL (ref 5–25)
CALCIUM SERPL-MCNC: 9.2 MG/DL (ref 8.4–10.2)
CHLORIDE SERPL-SCNC: 102 MMOL/L (ref 96–108)
CO2 SERPL-SCNC: 25 MMOL/L (ref 21–32)
CREAT SERPL-MCNC: 0.54 MG/DL (ref 0.6–1.3)
GFR SERPL CREATININE-BSD FRML MDRD: 119 ML/MIN/1.73SQ M
GLUCOSE P FAST SERPL-MCNC: 86 MG/DL (ref 65–99)
POTASSIUM SERPL-SCNC: 4.6 MMOL/L (ref 3.5–5.3)
PROT SERPL-MCNC: 7.1 G/DL (ref 6.4–8.4)
SODIUM SERPL-SCNC: 136 MMOL/L (ref 135–147)

## 2024-03-23 PROCEDURE — 36415 COLL VENOUS BLD VENIPUNCTURE: CPT

## 2024-03-23 PROCEDURE — 80053 COMPREHEN METABOLIC PANEL: CPT

## 2024-03-26 ENCOUNTER — CONSULT (OUTPATIENT)
Dept: NEPHROLOGY | Facility: CLINIC | Age: 54
End: 2024-03-26
Payer: COMMERCIAL

## 2024-03-26 VITALS
BODY MASS INDEX: 24.14 KG/M2 | OXYGEN SATURATION: 98 % | SYSTOLIC BLOOD PRESSURE: 122 MMHG | HEART RATE: 79 BPM | WEIGHT: 150.2 LBS | HEIGHT: 66 IN | DIASTOLIC BLOOD PRESSURE: 82 MMHG

## 2024-03-26 DIAGNOSIS — I10 PRIMARY HYPERTENSION: ICD-10-CM

## 2024-03-26 DIAGNOSIS — F10.10 ALCOHOL ABUSE: ICD-10-CM

## 2024-03-26 DIAGNOSIS — E87.1 HYPONATREMIA: Primary | ICD-10-CM

## 2024-03-26 PROCEDURE — 99204 OFFICE O/P NEW MOD 45 MIN: CPT | Performed by: INTERNAL MEDICINE

## 2024-03-26 NOTE — PROGRESS NOTES
[facility]  Consultation - Nephrology   Buzz Renteria 53 y.o. male MRN: [id number]      A/P:      1. Hyponatremia  -     Ambulatory Referral to Nephrology  -     Cortisol Level,7-9 AM Specimen; Future; Expected date: 05/18/2024  -     Urinalysis with microscopic; Future; Expected date: 05/18/2024  -     Sodium, urine, random; Future; Expected date: 05/18/2024  -     Osmolality, urine; Future; Expected date: 05/18/2024  -     TSH w/Reflex; Future; Expected date: 05/18/2024  -     Comprehensive metabolic panel; Future; Expected date: 05/18/2024  -     Albumin / creatinine urine ratio; Future; Expected date: 05/18/2024  -     Protein / creatinine ratio, urine; Future; Expected date: 05/18/2024    2. Primary hypertension    3. Alcohol abuse         I have reviewed pertinent labs and imaging with patient.    1.Hyponatremia, currently resolved.previously mild to moderate,chronic greater than 48 hours, likely asymptomatic, hypoosmolar likely.    Reviewed hyponatremia physiology.  His most recent sodium on 3/23/2024 was normal at 136 mmol/L.  Explained that at present he is in sodium and water balance.  Explained that previously in February 2023 he may have been volume depleted and could have high but hyponatremia due to this.  Otherwise, suspect that he was water overloading himself due to excessive fluid intake in addition to beer potomania with 3-6 beers per night.  He did not have any urine studies or recent urine analysis to confirm this.  He is also not had any cortisol/thyroid testing to exclude other etiologies.    Right now if I were to obtain urine studies it would not reflect any issues as he is sodium is normal.  Urine studies are only helpful if patient actually has hyponatremia at that time.  Will for completeness sake, check TSH, cortisol, proteinuria evaluation, CMP, urine osmolality, urine sodium.  Again his urine studies may or may not be helpful depending on  his sodium at that time.  I have advised him to cut down on his beer intake as this does contribute to overall fluid.  Advised to 2 to 3 g sodium diet.  Advised to increase protein intake as this helps with free water excretion.  He does report a balanced protein intake.    2 essential hypertension.  Secondary hyperaldosterone state can exist due to excessive alcohol intake.  His blood pressure appears well-controlled at present so he can continue with lisinopril.  Lisinopril may have contributed to not osmotic ADH release although, suspect his fluid intake and beer intake were the major driving factors.    3.  Alcohol abuse   advised to cut back and explained association with hyponatremia.  May consider vitamin supplements per primary discretion.  Conversation should also be had about alcohol cessation at discretion of primary.    Follow-up in 6 months.  Will check labs in 3 months and further lab monitoring as we need.    The patient and any family members present were counseled today on risks benefits and alternatives of any medications, and were agreeable to the treatment plan.  They were counseled on diagnostic testing if necessary, and projected outcomes as are available and medically indicated.  All questions were asked and answered during the encounter. If more questions are to arise the patient will call the office. Alarm and return precautions discussed and accepted.       Thank you for allowing us to participate in the care of your patient.        History of Present Illness   Physician Requesting Consult: No att. providers found  :   HPI: Buzz Renteria is a 53 y.o. year old male who presents to the nephrology office in [place] for evaluation of hyponatremia.  He reports his sodium disorder started in November when his hypertension was addressed.  He was started on lisinopril around that time.  He did not report any changes in his fluid/sodium intake around that time.  No significant GI disturbances.   Denies NSAID use.  Denies any hydrochlorothiazide use.  Denies any issues with prostate/urinary retention.  Denies thyroid/adrenal disorders.  However, it appears that his sodium disorders preceded this and on 2/1/2023 his sodium was 131, this was in the setting of diarrhea when he was in the emergency room.  On 10/30/2023 sodium was 127.  On 11/18/2023 sodium was 131.  On 2/17/2024 sodium was 128.  On 3/23/2024 his sodium was 136.  Patient has never had a urine sodium, urine osmolality, serum osmolality.  No TSH or cortisol testing.  No history of liver disease and his LFTs are okay.  His last urinalysis was from February 2023.    -he was drinking 8-10 tumblers of water at work. He was drinking more at water because of high temperatures. Works in a [employer] company.    He reports a balanced diet. He eats three times a day.   He does not ad salt to his food, he adds pepper.    Denies hx autoimmune disease/cancer.   Denies hx nephrolithiasis, pyelo/uti, hematuira.     He thought his BP medicine was causing weakness/foggy.    Drinks 6 pack on the weekend. Drinks 2-6 beers a night.       Constitutional ROS- Denies fatigue, fever, chills, night sweats, weight changes.  HEENT ROS- Denies headaches or history of trauma, blurred vision..  Endocrine ROS- No history diabetes mellitus or thyroid disease.  Cardiovascular ROS- Denies chest pain, palpitation, dyspnea exertion, orthopnea, claudication.  Pulmonary ROS-  Denies cough, hemoptysis, shortness of breath.  GI ROS- Denies abdominal pain, diarrhea, nausea, swallowing problems, vomiting, constipation, blood in stools, fecal incontinence.   Hematological ROS- Denies history of easy bruising, blood clots, bleeding or blood transfusions.  Genitourinary ROS- Denies recent hematuria, pyuria, flank pain, change in urinary stream, decreased urinary output, increased urinary frequency, nocturia, foamy urine, or urinary incontinence.  Lymphatic ROS- Denies  lymphadenopathy.  Musculoskeletal ROS- Denies history of muscle weakness, joint pain.  Dermatological ROS- Denies rash, wounds, ulcers, itching, jaundice.  Psychiatric ROS- Denies hallucinations, disorientation.  Neurological ROS- No stroke or TIA symptoms.     Historical Information   Past Medical History:   Diagnosis Date    Hypertension 2023    Noted in February thru ER     Past Surgical History:   Procedure Laterality Date    APPENDECTOMY      HAND FRACTURE REPAIR       Social History   Social History     Substance and Sexual Activity   Alcohol Use Yes    Alcohol/week: 30.0 standard drinks of alcohol    Types: 30 Cans of beer per week    Comment: daily     Social History     Substance and Sexual Activity   Drug Use Never     Social History     Tobacco Use   Smoking Status Every Day    Current packs/day: 1.00    Average packs/day: 1 pack/day for 35.0 years (35.0 ttl pk-yrs)    Types: Cigarettes   Smokeless Tobacco Never     Family History   Problem Relation Age of Onset    No Known Problems Mother     Diabetes Father     Hypertension Father        Meds/Allergies   all current active meds have been reviewed, current meds:     Current Outpatient Medications:     lisinopril (ZESTRIL) 30 mg tablet, Take 1 tablet (30 mg total) by mouth daily, Disp: 90 tablet, Rfl: 1     No Known Allergies    Objective     Vitals:    03/26/24 1546   BP: 122/82   Pulse: 79   SpO2: 98%       General Appearance:    No acute distress. Cooperative. Appears stated age.   Head:    Normocephalic. Atraumatic.    Eyes:    Lids, conjunctiva normal. No scleral icterus.   Ears:    Normal external ears.   Nose:   Nares normal. No drainage.   Mouth:   Lips, tongue normal. Mucosa normal.    Neck:   Supple. Symmetrical.   Back:     Symmetric. No CVA tenderness.   Lungs:     Normal respiratory effort. Clear to auscultation bilaterally.   Chest wall:    No tenderness or deformity.   Heart:    Regular rate and rhythm. Normal S1 and S2. No murmur. No JVD.  "No edema.   Abdomen:     Soft. Non-tender. Bowel sounds active.   Genitourinary:     Extremities:   Extremities normal. Atraumatic. No cyanosis.   Skin:   Warm and dry. No pallor, jaundice, rash, ecchymoses.   Neurologic:   Alert and oriented to person, place, time. No focal deficit.         Current Weight: Weight - Scale: 68.1 kg (150 lb 3.2 oz)    First Weight:    Wt Readings from Last 3 Encounters:   03/26/24 68.1 kg (150 lb 3.2 oz)   02/26/24 71 kg (156 lb 9.6 oz)   11/24/23 70.4 kg (155 lb 3.2 oz)        Lab Results:  I have personally reviewed pertinent labs.    CBC: No results found for: \"WBC\", \"HGB\", \"HCT\", \"MCV\", \"PLT\", \"ADJUSTEDWBC\", \"RBC\", \"MCH\", \"MCHC\", \"RDW\", \"MPV\", \"NRBC\"  CMP: No results found for: \"NA\", \"K\", \"CL\", \"CO2\", \"ANIONGAP\", \"BUN\", \"CREATININE\", \"GLUCOSE\", \"CALCIUM\", \"AST\", \"ALT\", \"ALKPHOS\", \"PROT\", \"BILITOT\", \"EGFR\"  Phosphorus: No results found for: \"PHOS\"  Magnesium: No results found for: \"MG\"  Urinalysis: No results found for: \"COLORU\", \"CLARITYU\", \"SPECGRAV\", \"PHUR\", \"LEUKOCYTESUR\", \"NITRITE\", \"PROTEINUA\", \"GLUCOSEU\", \"KETONESU\", \"BILIRUBINUR\", \"BLOODU\"  Ionized Calcium: No results found for: \"CAION\"      Radiology review:  No results found.      Kareen Roberson,       This consultation note was produced in part using a dictation device which may document imprecise wording from author's original intent.  -  "

## 2024-03-26 NOTE — PATIENT INSTRUCTIONS
We discussed that sodium level could have been low due to multifactorial process including high fluid intake, lisinopril, alcohol use. Will check for hormonal disorders that may contribute to low sodium. Will repeat sodium level and urine studies in May.  Avoid routine use of NSAID---motrin,advil,aleve,ibuprofen.  Advise to cut back alcohol use.   Keep fluid intake around 2 quart a day.  Maintain a 2-3 gram sodium diet.

## 2024-05-25 ENCOUNTER — APPOINTMENT (OUTPATIENT)
Dept: LAB | Facility: MEDICAL CENTER | Age: 54
End: 2024-05-25
Payer: COMMERCIAL

## 2024-05-25 DIAGNOSIS — E87.1 HYPONATREMIA: Primary | ICD-10-CM

## 2024-05-25 DIAGNOSIS — R73.09 ELEVATED GLUCOSE LEVEL: ICD-10-CM

## 2024-05-25 LAB
ALBUMIN SERPL BCP-MCNC: 4.7 G/DL (ref 3.5–5)
ALP SERPL-CCNC: 66 U/L (ref 34–104)
ALT SERPL W P-5'-P-CCNC: 26 U/L (ref 7–52)
ANION GAP SERPL CALCULATED.3IONS-SCNC: 10 MMOL/L (ref 4–13)
AST SERPL W P-5'-P-CCNC: 28 U/L (ref 13–39)
BACTERIA UR QL AUTO: NORMAL /HPF
BILIRUB SERPL-MCNC: 0.26 MG/DL (ref 0.2–1)
BILIRUB UR QL STRIP: NEGATIVE
BUN SERPL-MCNC: 8 MG/DL (ref 5–25)
CALCIUM SERPL-MCNC: 8.7 MG/DL (ref 8.4–10.2)
CHLORIDE SERPL-SCNC: 98 MMOL/L (ref 96–108)
CLARITY UR: CLEAR
CO2 SERPL-SCNC: 23 MMOL/L (ref 21–32)
COLOR UR: COLORLESS
CORTIS AM PEAK SERPL-MCNC: 17.5 UG/DL (ref 6.7–22.6)
CREAT SERPL-MCNC: 0.54 MG/DL (ref 0.6–1.3)
CREAT UR-MCNC: 40.5 MG/DL
CREAT UR-MCNC: 40.5 MG/DL
EST. AVERAGE GLUCOSE BLD GHB EST-MCNC: 120 MG/DL
GFR SERPL CREATININE-BSD FRML MDRD: 119 ML/MIN/1.73SQ M
GLUCOSE P FAST SERPL-MCNC: 80 MG/DL (ref 65–99)
GLUCOSE UR STRIP-MCNC: NEGATIVE MG/DL
HBA1C MFR BLD: 5.8 %
HGB UR QL STRIP.AUTO: NEGATIVE
KETONES UR STRIP-MCNC: NEGATIVE MG/DL
LEUKOCYTE ESTERASE UR QL STRIP: NEGATIVE
MICROALBUMIN UR-MCNC: <7 MG/L
MICROALBUMIN/CREAT 24H UR: <17 MG/G CREATININE (ref 0–30)
NITRITE UR QL STRIP: NEGATIVE
NON-SQ EPI CELLS URNS QL MICRO: NORMAL /HPF
OSMOLALITY UR: 314 MMOL/KG
PH UR STRIP.AUTO: 5.5 [PH]
POTASSIUM SERPL-SCNC: 4.7 MMOL/L (ref 3.5–5.3)
PROT SERPL-MCNC: 7.3 G/DL (ref 6.4–8.4)
PROT UR STRIP-MCNC: NEGATIVE MG/DL
PROT UR-MCNC: <4 MG/DL
RBC #/AREA URNS AUTO: NORMAL /HPF
SODIUM 24H UR-SCNC: 51 MOL/L
SODIUM SERPL-SCNC: 131 MMOL/L (ref 135–147)
SP GR UR STRIP.AUTO: 1.01 (ref 1–1.03)
TSH SERPL DL<=0.05 MIU/L-ACNC: 0.67 UIU/ML (ref 0.45–4.5)
UROBILINOGEN UR STRIP-ACNC: <2 MG/DL
WBC #/AREA URNS AUTO: NORMAL /HPF

## 2024-05-25 PROCEDURE — 84156 ASSAY OF PROTEIN URINE: CPT

## 2024-05-25 PROCEDURE — 84443 ASSAY THYROID STIM HORMONE: CPT

## 2024-05-25 PROCEDURE — 82043 UR ALBUMIN QUANTITATIVE: CPT

## 2024-05-25 PROCEDURE — 80053 COMPREHEN METABOLIC PANEL: CPT

## 2024-05-25 PROCEDURE — 82533 TOTAL CORTISOL: CPT

## 2024-05-25 PROCEDURE — 83935 ASSAY OF URINE OSMOLALITY: CPT

## 2024-05-25 PROCEDURE — 36415 COLL VENOUS BLD VENIPUNCTURE: CPT

## 2024-05-25 PROCEDURE — 81001 URINALYSIS AUTO W/SCOPE: CPT

## 2024-05-25 PROCEDURE — 82570 ASSAY OF URINE CREATININE: CPT

## 2024-05-25 PROCEDURE — 84300 ASSAY OF URINE SODIUM: CPT

## 2024-05-25 PROCEDURE — 83036 HEMOGLOBIN GLYCOSYLATED A1C: CPT

## 2024-05-28 ENCOUNTER — TELEPHONE (OUTPATIENT)
Dept: NEPHROLOGY | Facility: CLINIC | Age: 54
End: 2024-05-28

## 2024-05-28 NOTE — TELEPHONE ENCOUNTER
I called and left a message for patient to call office back to obtain his results.     ----- Message from Kareen Roberson DO sent at 5/27/2024  5:41 PM EDT -----  Please call patient sodium is mildly low which is chronic and has been ongoing. Does he follow a fluid restriction?

## 2024-05-28 NOTE — TELEPHONE ENCOUNTER
Patient called back- he is trying to follow the fluid restriction but it is very hot at work so he has trouble with it. His PCP recommended 64oz per day.

## 2024-05-29 DIAGNOSIS — R73.09 ELEVATED GLUCOSE LEVEL: Primary | ICD-10-CM

## 2024-05-30 ENCOUNTER — OFFICE VISIT (OUTPATIENT)
Dept: FAMILY MEDICINE CLINIC | Facility: CLINIC | Age: 54
End: 2024-05-30
Payer: COMMERCIAL

## 2024-05-30 VITALS
HEIGHT: 66 IN | SYSTOLIC BLOOD PRESSURE: 136 MMHG | RESPIRATION RATE: 18 BRPM | WEIGHT: 146 LBS | TEMPERATURE: 98.5 F | BODY MASS INDEX: 23.46 KG/M2 | OXYGEN SATURATION: 99 % | DIASTOLIC BLOOD PRESSURE: 84 MMHG | HEART RATE: 91 BPM

## 2024-05-30 DIAGNOSIS — R73.09 ELEVATED GLUCOSE LEVEL: ICD-10-CM

## 2024-05-30 DIAGNOSIS — A08.4 VIRAL GASTROENTERITIS: ICD-10-CM

## 2024-05-30 DIAGNOSIS — I10 PRIMARY HYPERTENSION: Primary | ICD-10-CM

## 2024-05-30 DIAGNOSIS — E87.1 HYPONATREMIA: ICD-10-CM

## 2024-05-30 PROCEDURE — 99214 OFFICE O/P EST MOD 30 MIN: CPT | Performed by: PHYSICIAN ASSISTANT

## 2024-05-30 NOTE — ASSESSMENT & PLAN NOTE
A1c improved to 5.8.  Recommended that he continue to watch diet.  We will recheck A1c in 3 to 6 months

## 2024-05-30 NOTE — ASSESSMENT & PLAN NOTE
Blood pressure is mildly elevated in office today.  Patient is getting better readings at home.  Systolic readings are 120s to 130s and diastolic readings are 80s at home.  Continue lisinopril 30 mg daily.  I encouraged him to continue to monitor blood pressure periodically

## 2024-05-30 NOTE — PROGRESS NOTES
Ambulatory Visit  Name: Buzz Renteria      : 1970      MRN: [id number]  Encounter Provider: Layla Trujillo PA-C  Encounter Date: 2024   Encounter department: [facility]    Assessment & Plan   1. Primary hypertension  Assessment & Plan:  Blood pressure is mildly elevated in office today.  Patient is getting better readings at home.  Systolic readings are 120s to 130s and diastolic readings are 80s at home.  Continue lisinopril 30 mg daily.  I encouraged him to continue to monitor blood pressure periodically  2. Elevated glucose level  Assessment & Plan:  A1c improved to 5.8.  Recommended that he continue to watch diet.  We will recheck A1c in 3 to 6 months  3. Hyponatremia  Assessment & Plan:  Continue to follow with nephrology.  Encouraged patient to be more compliant with fluid restriction.  Reviewed most recent labs with patient.  Recommended that he try to restrict his fluids to 64 ounces per day.  He will reach out to nephrology for additional recommendations  4. Viral gastroenteritis  Assessment & Plan:  Recommended brat diet.  Stay hydrated.  Advance diet as tolerated.       History of Present Illness   {Disappearing Hyperlinks I Encounters * My Last Note * Since Last Visit * History :81276}  Buzz is a very pleasant 53-year-old male who is here today accompanied by his wife for a follow-up.  He admits that his blood pressures have been stable on lisinopril.  He admits that the dizziness and fogginess have improved.  He has been trying to follow the fluid restriction for hyponatremia, but has been struggling more in the warmer months.  He did have a follow-up with nephrology.  They encouraged him to continue with fluid restriction.  He admits that he has been trying to watch his diet.  He also started on Monday with diarrhea, abdominal cramping, and nausea.  He admits that this is slowly improving.  He denies any black stools, bloody stools, vomiting, fevers, or chills.  He is slowly  "regaining his appetite.        Review of Systems   Constitutional:  Negative for chills, diaphoresis, fatigue and fever.   HENT:  Negative for congestion, ear pain, postnasal drip, rhinorrhea, sneezing, sore throat and trouble swallowing.    Eyes:  Negative for pain and visual disturbance.   Respiratory:  Negative for apnea, cough, shortness of breath and wheezing.    Cardiovascular:  Negative for chest pain and palpitations.   Gastrointestinal:  Positive for abdominal pain, diarrhea and nausea. Negative for constipation and vomiting.   Genitourinary:  Negative for dysuria and hematuria.   Musculoskeletal:  Negative for arthralgias, gait problem and myalgias.   Neurological:  Negative for dizziness, syncope, weakness, light-headedness, numbness and headaches.   Psychiatric/Behavioral:  Negative for suicidal ideas. The patient is not nervous/anxious.        Objective   {Disappearing Hyperlinks   Review Vitals * Enter New Vitals * Results Review * Labs * Imaging * Cardiology * Procedures * Lung Cancer Screening :[id number]}  /84   Pulse 91   Temp 98.5 °F (36.9 °C) (Temporal)   Resp 18   Ht 5' 6\" (1.676 m)   Wt 66.2 kg (146 lb)   SpO2 99%   BMI 23.57 kg/m²     Physical Exam  Vitals and nursing note reviewed.   Constitutional:       Appearance: He is well-developed.   HENT:      Head: Normocephalic and atraumatic.      Right Ear: External ear normal.      Left Ear: External ear normal.      Nose: Nose normal.      Mouth/Throat:      Pharynx: No oropharyngeal exudate or posterior oropharyngeal erythema.   Eyes:      Extraocular Movements: Extraocular movements intact.   Cardiovascular:      Rate and Rhythm: Normal rate and regular rhythm.      Heart sounds: Normal heart sounds. No murmur heard.     No friction rub. No gallop.   Pulmonary:      Effort: Pulmonary effort is normal. No respiratory distress.      Breath sounds: Normal breath sounds. No wheezing or rales.   Abdominal:      General: Bowel sounds are " increased.      Palpations: Abdomen is soft.      Tenderness: There is no abdominal tenderness. There is no guarding or rebound.   Musculoskeletal:         General: Normal range of motion.      Cervical back: Normal range of motion and neck supple.      Right lower leg: No edema.      Left lower leg: No edema.   Skin:     General: Skin is warm and dry.   Neurological:      Mental Status: He is alert and oriented to person, place, and time.   Psychiatric:         Behavior: Behavior normal.         Thought Content: Thought content normal.         Judgment: Judgment normal.       Administrative Statements {Disappearing Hyperlinks I  Level of Service * [id number]}

## 2024-05-30 NOTE — ASSESSMENT & PLAN NOTE
Continue to follow with nephrology.  Encouraged patient to be more compliant with fluid restriction.  Reviewed most recent labs with patient.  Recommended that he try to restrict his fluids to 64 ounces per day.  He will reach out to nephrology for additional recommendations

## 2024-05-30 NOTE — LETTER
May 30, 2024     Patient: Buzz Renteria  YOB: 1970  Date of Visit: 5/30/2024      To Whom it May Concern:    Buzz Renteria is under my professional care. Buzz was seen in my office on 5/30/2024. Buzz should be excused from work 5/28/24-5/30/24. Buzz may return to work on 5/31/2024 .    If you have any questions or concerns, please don't hesitate to call.         Sincerely,          Layla Trujillo PA-C

## 2024-06-29 PROBLEM — A08.4 VIRAL GASTROENTERITIS: Status: RESOLVED | Noted: 2024-05-30 | Resolved: 2024-06-29

## 2024-07-27 DIAGNOSIS — I10 PRIMARY HYPERTENSION: ICD-10-CM

## 2024-07-28 RX ORDER — LISINOPRIL 30 MG/1
30 TABLET ORAL DAILY
Qty: 100 TABLET | Refills: 1 | Status: SHIPPED | OUTPATIENT
Start: 2024-07-28

## 2024-08-25 DIAGNOSIS — I10 PRIMARY HYPERTENSION: ICD-10-CM

## 2024-08-26 RX ORDER — LISINOPRIL 30 MG/1
30 TABLET ORAL DAILY
Qty: 100 TABLET | Refills: 0 | OUTPATIENT
Start: 2024-08-26

## 2024-08-26 NOTE — TELEPHONE ENCOUNTER
Patients spouse called to see why this was denied. Informed them a new script was sent on 07/21/24. Patient will call pharmacy for refill.

## 2024-09-19 DIAGNOSIS — I10 PRIMARY HYPERTENSION: Primary | ICD-10-CM

## 2024-09-21 ENCOUNTER — APPOINTMENT (OUTPATIENT)
Dept: LAB | Facility: MEDICAL CENTER | Age: 54
End: 2024-09-21
Payer: COMMERCIAL

## 2024-09-21 DIAGNOSIS — I10 PRIMARY HYPERTENSION: ICD-10-CM

## 2024-09-21 DIAGNOSIS — R73.09 ELEVATED GLUCOSE LEVEL: ICD-10-CM

## 2024-09-21 LAB
ALBUMIN SERPL BCG-MCNC: 4.5 G/DL (ref 3.5–5)
ALP SERPL-CCNC: 70 U/L (ref 34–104)
ALT SERPL W P-5'-P-CCNC: 24 U/L (ref 7–52)
ANION GAP SERPL CALCULATED.3IONS-SCNC: 10 MMOL/L (ref 4–13)
AST SERPL W P-5'-P-CCNC: 23 U/L (ref 13–39)
BILIRUB SERPL-MCNC: 0.41 MG/DL (ref 0.2–1)
BUN SERPL-MCNC: 13 MG/DL (ref 5–25)
CALCIUM SERPL-MCNC: 9 MG/DL (ref 8.4–10.2)
CHLORIDE SERPL-SCNC: 99 MMOL/L (ref 96–108)
CO2 SERPL-SCNC: 22 MMOL/L (ref 21–32)
CREAT SERPL-MCNC: 0.57 MG/DL (ref 0.6–1.3)
CREAT UR-MCNC: 88.8 MG/DL
EST. AVERAGE GLUCOSE BLD GHB EST-MCNC: 120 MG/DL
GFR SERPL CREATININE-BSD FRML MDRD: 117 ML/MIN/1.73SQ M
GLUCOSE P FAST SERPL-MCNC: 82 MG/DL (ref 65–99)
HBA1C MFR BLD: 5.8 %
POTASSIUM SERPL-SCNC: 5.1 MMOL/L (ref 3.5–5.3)
PROT SERPL-MCNC: 7.5 G/DL (ref 6.4–8.4)
PROT UR-MCNC: 6.3 MG/DL
PROT/CREAT UR: 0.1 MG/G{CREAT} (ref 0–0.1)
SODIUM SERPL-SCNC: 131 MMOL/L (ref 135–147)

## 2024-09-21 PROCEDURE — 83036 HEMOGLOBIN GLYCOSYLATED A1C: CPT

## 2024-09-21 PROCEDURE — 80053 COMPREHEN METABOLIC PANEL: CPT

## 2024-09-21 PROCEDURE — 36415 COLL VENOUS BLD VENIPUNCTURE: CPT

## 2024-09-21 PROCEDURE — 82570 ASSAY OF URINE CREATININE: CPT

## 2024-09-21 PROCEDURE — 84156 ASSAY OF PROTEIN URINE: CPT

## 2024-09-30 ENCOUNTER — OFFICE VISIT (OUTPATIENT)
Dept: NEPHROLOGY | Facility: CLINIC | Age: 54
End: 2024-09-30
Payer: COMMERCIAL

## 2024-09-30 VITALS
BODY MASS INDEX: 23.63 KG/M2 | HEART RATE: 81 BPM | HEIGHT: 66 IN | DIASTOLIC BLOOD PRESSURE: 84 MMHG | WEIGHT: 147 LBS | OXYGEN SATURATION: 99 % | SYSTOLIC BLOOD PRESSURE: 148 MMHG | TEMPERATURE: 98.5 F

## 2024-09-30 DIAGNOSIS — R80.1 PERSISTENT PROTEINURIA: ICD-10-CM

## 2024-09-30 DIAGNOSIS — I10 PRIMARY HYPERTENSION: Primary | ICD-10-CM

## 2024-09-30 DIAGNOSIS — E87.1 HYPONATREMIA: ICD-10-CM

## 2024-09-30 PROCEDURE — 99214 OFFICE O/P EST MOD 30 MIN: CPT

## 2024-09-30 NOTE — PROGRESS NOTES
"Ambulatory Visit  Name: Buzz Renteria      : 1970      MRN: [id number]  Encounter Provider: GABRIELE Virk  Encounter Date: 2024   Encounter department: [facility]    Assessment & Plan  Primary hypertension  Blood pressure 148/84 mmHg. Does not check at home. Continue 2-3 g sodium restriction.  Continue lisinopril 30 mg daily.Orders:    Comprehensive metabolic panel; Future    CBC and differential; Future    Urinalysis with microscopic; Future    Albumin / creatinine urine ratio; Future    Hyponatremia  Sodium 131 mmol/L, decreased but stable, .  Sodium has been stable at 131 since May 2024.  Avoid excessive fluid intake, limit to less than 2 L/day.  States he has decreased overall fluid intake but has \"a few beers\" every evening. Discussed maintaining adequate protein intake including protein shakes.  Orders:    Comprehensive metabolic panel; Future    CBC and differential; Future    Urinalysis with microscopic; Future    Albumin / creatinine urine ratio; Future      History of Present Illness     Buzz Renteria is a 54 y.o. male who presents to [place] office for follow-up of primary hypertension and hyponatremia.  PMH includes elevated serum calcium, elevated glucose, alcohol abuse and tobacco abuse.    Denies recent hospitalizations, emergency department visits or illnesses.  States overall has been feeling well.       Review of Systems   Constitutional:  Negative for activity change, appetite change, chills, fatigue and fever.   HENT:  Negative for ear pain and sore throat.    Eyes:  Negative for pain and visual disturbance.   Respiratory:  Negative for cough and shortness of breath.    Cardiovascular:  Negative for chest pain, palpitations and leg swelling.   Gastrointestinal:  Negative for abdominal pain, constipation, diarrhea, nausea and vomiting.   Endocrine: Negative.    Genitourinary:  Negative for decreased urine volume, " difficulty urinating, dysuria and hematuria.   Musculoskeletal:  Negative for arthralgias and back pain.   Skin:  Negative for color change and rash.   Allergic/Immunologic: Negative.    Neurological:  Negative for dizziness, seizures, syncope, weakness, light-headedness and headaches.   Hematological: Negative.    Psychiatric/Behavioral: Negative.     All other systems reviewed and are negative.          Objective     There were no vitals taken for this visit.    Physical Exam  Vitals reviewed.   Constitutional:       General: He is not in acute distress.     Appearance: Normal appearance. He is well-developed and normal weight. He is not ill-appearing.   HENT:      Head: Normocephalic and atraumatic.      Right Ear: External ear normal.      Left Ear: External ear normal.      Nose: Nose normal.      Mouth/Throat:      Mouth: Mucous membranes are moist.      Pharynx: Oropharynx is clear.   Eyes:      Extraocular Movements: Extraocular movements intact.      Conjunctiva/sclera: Conjunctivae normal.   Cardiovascular:      Rate and Rhythm: Normal rate and regular rhythm.      Heart sounds: Normal heart sounds. No murmur heard.  Pulmonary:      Effort: Pulmonary effort is normal. No respiratory distress.      Breath sounds: Normal breath sounds.   Abdominal:      Palpations: Abdomen is soft.      Tenderness: There is no abdominal tenderness.   Musculoskeletal:         General: No swelling.      Cervical back: Neck supple.      Right lower leg: No edema.      Left lower leg: No edema.   Skin:     General: Skin is warm and dry.      Capillary Refill: Capillary refill takes less than 2 seconds.      Coloration: Skin is pale.   Neurological:      General: No focal deficit present.      Mental Status: He is alert and oriented to person, place, and time.   Psychiatric:         Mood and Affect: Mood normal.

## 2024-09-30 NOTE — ASSESSMENT & PLAN NOTE
"Sodium 131 mmol/L, decreased but stable, 9/21.  Sodium has been stable at 131 since May 2024.  Avoid excessive fluid intake, limit to less than 2 L/day.  States he has decreased overall fluid intake but has \"a few beers\" every evening. Discussed maintaining adequate protein intake including protein shakes.  Orders:    Comprehensive metabolic panel; Future    CBC and differential; Future    Urinalysis with microscopic; Future    Albumin / creatinine urine ratio; Future    "

## 2024-09-30 NOTE — PATIENT INSTRUCTIONS
Pleasure seeing you today.     Your sodium was 131 mmol/L, 9/21. This is decreased but stable. Try to maintain a fluid restriction of about 2L per day. Maintain a low sodium diet, 2-3 grams of sodium per day. Aim for about 60-70 grams of protein per day.     Please check blood pressure at home 3-4 times per week and keep a record of readings.  If blood pressure upper number is consistently less than 100 or greater than 150 please contact the office.  Please also call the office if you are experiencing increased headaches, dizziness, lightheadedness, chest pain or blurred vision.     Please follow up with Dr. Roberson in 6 months with labs prior to the visit.

## 2024-09-30 NOTE — ASSESSMENT & PLAN NOTE
Blood pressure 148/84 mmHg. Does not check at home. Continue 2-3 g sodium restriction.  Continue lisinopril 30 mg daily.Orders:    Comprehensive metabolic panel; Future    CBC and differential; Future    Urinalysis with microscopic; Future    Albumin / creatinine urine ratio; Future

## 2024-10-07 ENCOUNTER — OFFICE VISIT (OUTPATIENT)
Dept: FAMILY MEDICINE CLINIC | Facility: CLINIC | Age: 54
End: 2024-10-07
Payer: COMMERCIAL

## 2024-10-07 VITALS
DIASTOLIC BLOOD PRESSURE: 82 MMHG | HEIGHT: 66 IN | HEART RATE: 81 BPM | WEIGHT: 146.6 LBS | BODY MASS INDEX: 23.56 KG/M2 | OXYGEN SATURATION: 98 % | SYSTOLIC BLOOD PRESSURE: 142 MMHG

## 2024-10-07 DIAGNOSIS — R73.09 ELEVATED GLUCOSE LEVEL: ICD-10-CM

## 2024-10-07 DIAGNOSIS — I10 PRIMARY HYPERTENSION: Primary | ICD-10-CM

## 2024-10-07 DIAGNOSIS — E87.1 HYPONATREMIA: ICD-10-CM

## 2024-10-07 PROCEDURE — 99214 OFFICE O/P EST MOD 30 MIN: CPT | Performed by: PHYSICIAN ASSISTANT

## 2024-10-07 NOTE — ASSESSMENT & PLAN NOTE
Most recent A1c was stable at 5.8.  Recommended low-carb, high-protein diet.  We will recheck labs again in 3 months.  Orders:    Hemoglobin A1C; Future

## 2024-10-07 NOTE — PROGRESS NOTES
Ambulatory Visit  Name: Buzz Renteria      : 1970      MRN: [id number]  Encounter Provider: Layla Trujillo PA-C  Encounter Date: 10/7/2024   Encounter department: [facility]    Assessment & Plan  Primary hypertension  Blood pressure is stable.  Continue lisinopril 30 mg daily.  Orders:    Comprehensive metabolic panel; Future    Hyponatremia  Sodium stable at 131.  Recommended that he try to continue to follow a 2 L/day fluid restriction.  Encouraged patient to incorporate more protein into diet.  Encouraged him to continue protein shakes.  We will recheck labs in 3 months  Orders:    Comprehensive metabolic panel; Future    Elevated glucose level  Most recent A1c was stable at 5.8.  Recommended low-carb, high-protein diet.  We will recheck labs again in 3 months.  Orders:    Hemoglobin A1C; Future       History of Present Illness     Buzz is a very pleasant 54-year-old male who is here today for a follow-up for hypertension and hyponatremia.  He was evaluated by nephrology.  They recommended a fluid restriction, limiting alcohol, and watching diet.  He admits that he started protein shakes.  He feels like he has more energy since starting the protein shakes.  He denies any other concerns or problems.  He has been compliant with his blood pressure medication.  He denies any dizziness, lightheadedness, chest pains, or shortness of breath.          Review of Systems   Constitutional:  Negative for chills, diaphoresis, fatigue and fever.   HENT:  Negative for congestion, ear pain, postnasal drip, rhinorrhea, sneezing, sore throat and trouble swallowing.    Eyes:  Negative for pain and visual disturbance.   Respiratory:  Negative for apnea, cough, shortness of breath and wheezing.    Cardiovascular:  Negative for chest pain and palpitations.   Gastrointestinal:  Negative for abdominal pain, constipation, diarrhea, nausea and vomiting.   Genitourinary:  Negative for dysuria and hematuria.  "  Musculoskeletal:  Negative for arthralgias, gait problem and myalgias.   Neurological:  Negative for dizziness, syncope, weakness, light-headedness, numbness and headaches.   Psychiatric/Behavioral:  Negative for suicidal ideas. The patient is not nervous/anxious.            Objective     /82   Pulse 81   Ht 5' 6\" (1.676 m)   Wt 66.5 kg (146 lb 9.6 oz)   SpO2 98%   BMI 23.66 kg/m²     Physical Exam  Vitals and nursing note reviewed.   Constitutional:       General: He is not in acute distress.     Appearance: He is well-developed.   HENT:      Head: Normocephalic and atraumatic.      Right Ear: External ear normal.      Left Ear: External ear normal.   Eyes:      Extraocular Movements: Extraocular movements intact.      Conjunctiva/sclera: Conjunctivae normal.   Cardiovascular:      Rate and Rhythm: Normal rate and regular rhythm.      Heart sounds: No murmur heard.     No friction rub. No gallop.   Pulmonary:      Effort: Pulmonary effort is normal. No respiratory distress.      Breath sounds: Normal breath sounds. No wheezing, rhonchi or rales.   Musculoskeletal:         General: No swelling.      Cervical back: Neck supple.      Right lower leg: No edema.      Left lower leg: No edema.   Skin:     General: Skin is warm and dry.      Capillary Refill: Capillary refill takes less than 2 seconds.   Neurological:      Mental Status: He is alert.   Psychiatric:         Mood and Affect: Mood normal.         "

## 2024-10-07 NOTE — ASSESSMENT & PLAN NOTE
Sodium stable at 131.  Recommended that he try to continue to follow a 2 L/day fluid restriction.  Encouraged patient to incorporate more protein into diet.  Encouraged him to continue protein shakes.  We will recheck labs in 3 months  Orders:    Comprehensive metabolic panel; Future

## 2024-10-07 NOTE — ASSESSMENT & PLAN NOTE
Blood pressure is stable.  Continue lisinopril 30 mg daily.  Orders:    Comprehensive metabolic panel; Future

## 2024-12-30 ENCOUNTER — TELEPHONE (OUTPATIENT)
Dept: FAMILY MEDICINE CLINIC | Facility: CLINIC | Age: 54
End: 2024-12-30

## 2025-02-04 DIAGNOSIS — I10 PRIMARY HYPERTENSION: ICD-10-CM

## 2025-02-05 ENCOUNTER — TELEPHONE (OUTPATIENT)
Dept: FAMILY MEDICINE CLINIC | Facility: CLINIC | Age: 55
End: 2025-02-05

## 2025-02-05 RX ORDER — LISINOPRIL 30 MG/1
TABLET ORAL
Qty: 30 TABLET | Refills: 5 | Status: SHIPPED | OUTPATIENT
Start: 2025-02-05

## 2025-02-05 NOTE — TELEPHONE ENCOUNTER
LEFT VM TO CX APPT DUE TO WEATHER, PT ADVISED TO GO TO URGENT CARE....nausea, diarrhea, stomach cramps

## 2025-02-07 ENCOUNTER — OFFICE VISIT (OUTPATIENT)
Dept: FAMILY MEDICINE CLINIC | Facility: CLINIC | Age: 55
End: 2025-02-07
Payer: COMMERCIAL

## 2025-02-07 VITALS
SYSTOLIC BLOOD PRESSURE: 126 MMHG | OXYGEN SATURATION: 98 % | HEIGHT: 66 IN | DIASTOLIC BLOOD PRESSURE: 78 MMHG | WEIGHT: 144 LBS | HEART RATE: 86 BPM | TEMPERATURE: 98.7 F | BODY MASS INDEX: 23.14 KG/M2 | RESPIRATION RATE: 17 BRPM

## 2025-02-07 DIAGNOSIS — A08.4 VIRAL GASTROENTERITIS: Primary | ICD-10-CM

## 2025-02-07 PROCEDURE — 99213 OFFICE O/P EST LOW 20 MIN: CPT | Performed by: PHYSICIAN ASSISTANT

## 2025-02-07 NOTE — LETTER
February 7, 2025     Patient: Buzz Renteria  YOB: 1970  Date of Visit: 2/7/2025      To Whom it May Concern:    Buzz Renteria is under my professional care. Buzz was seen in my office on 2/7/2025. Buzz may return to work on 2/10/2025 .    If you have any questions or concerns, please don't hesitate to call.         Sincerely,          Ruma Miles PA-C        CC: No Recipients

## 2025-02-07 NOTE — PROGRESS NOTES
"Name: Buzz Renteria      : 1970      MRN: [id number]  Encounter Provider: Ruma Miles PA-C  Encounter Date: 2025   Encounter department: [facility]  :  Assessment & Plan  Viral gastroenteritis  Recommend BRAT diet. Explained symptoms self-limiting. Offered Zofran, however pt states he already has some at home.              Depression Screening and Follow-up Plan: Patient was screened for depression during today's encounter. They screened negative with a PHQ-2 score of 0.      History of Present Illness   Buzz is here today due to resolving GI symptoms. Started on Monday with NVD, symptoms have resolved mostly, now just with some fatigue. Hasn't vomited since Wed, no longer having diarrhea.       Review of Systems   Constitutional:  Negative for chills and fever.   HENT:  Negative for ear pain and sore throat.    Eyes:  Negative for pain and visual disturbance.   Respiratory:  Negative for cough and shortness of breath.    Cardiovascular:  Negative for chest pain and palpitations.   Gastrointestinal:  Positive for abdominal pain, diarrhea, nausea and vomiting.        Resolved   Genitourinary:  Negative for dysuria and hematuria.   Musculoskeletal:  Negative for arthralgias and back pain.   Skin:  Negative for color change and rash.   Neurological:  Negative for seizures and syncope.   All other systems reviewed and are negative.      Objective   /78 (BP Location: Left leg, Patient Position: Sitting, Cuff Size: Standard)   Pulse 86   Temp 98.7 °F (37.1 °C) (Temporal)   Resp 17   Ht 5' 6\" (1.676 m)   Wt 65.3 kg (144 lb)   SpO2 98%   BMI 23.24 kg/m²      Physical Exam  Vitals reviewed.   Constitutional:       General: He is not in acute distress.     Appearance: He is well-developed. He is not diaphoretic.   HENT:      Head: Normocephalic and atraumatic.      Right Ear: Hearing, tympanic membrane, ear canal and external ear normal.      Left Ear: Hearing, tympanic membrane, ear " canal and external ear normal.      Nose: Nose normal.      Mouth/Throat:      Mouth: Mucous membranes are moist.      Pharynx: Oropharynx is clear. Uvula midline. No oropharyngeal exudate.   Eyes:      General: No scleral icterus.        Right eye: No discharge.         Left eye: No discharge.      Conjunctiva/sclera: Conjunctivae normal.   Neck:      Thyroid: No thyromegaly.      Vascular: No carotid bruit.   Cardiovascular:      Rate and Rhythm: Normal rate and regular rhythm.      Heart sounds: Normal heart sounds. No murmur heard.  Pulmonary:      Effort: Pulmonary effort is normal. No respiratory distress.      Breath sounds: Normal breath sounds. No wheezing.   Abdominal:      General: Bowel sounds are normal. There is no distension.      Palpations: Abdomen is soft. There is no mass.      Tenderness: There is no abdominal tenderness. There is no guarding or rebound.   Musculoskeletal:         General: No tenderness. Normal range of motion.      Cervical back: Neck supple.   Lymphadenopathy:      Cervical: No cervical adenopathy.   Skin:     General: Skin is warm and dry.      Findings: No erythema or rash.   Neurological:      Mental Status: He is alert and oriented to person, place, and time.   Psychiatric:         Behavior: Behavior normal.         Thought Content: Thought content normal.         Judgment: Judgment normal.

## 2025-03-25 DIAGNOSIS — Z12.5 SCREENING FOR PROSTATE CANCER: ICD-10-CM

## 2025-03-25 DIAGNOSIS — Z13.220 SCREENING FOR HYPERLIPIDEMIA: Primary | ICD-10-CM

## 2025-03-29 ENCOUNTER — APPOINTMENT (OUTPATIENT)
Dept: LAB | Facility: MEDICAL CENTER | Age: 55
End: 2025-03-29
Payer: COMMERCIAL

## 2025-03-29 DIAGNOSIS — R80.1 PERSISTENT PROTEINURIA: ICD-10-CM

## 2025-03-29 DIAGNOSIS — Z12.5 SCREENING FOR PROSTATE CANCER: ICD-10-CM

## 2025-03-29 DIAGNOSIS — Z13.220 SCREENING FOR HYPERLIPIDEMIA: ICD-10-CM

## 2025-03-29 DIAGNOSIS — E87.1 HYPONATREMIA: ICD-10-CM

## 2025-03-29 DIAGNOSIS — R73.09 ELEVATED GLUCOSE LEVEL: ICD-10-CM

## 2025-03-29 DIAGNOSIS — I10 PRIMARY HYPERTENSION: ICD-10-CM

## 2025-03-29 LAB
ALBUMIN SERPL BCG-MCNC: 4.3 G/DL (ref 3.5–5)
ALP SERPL-CCNC: 88 U/L (ref 34–104)
ALT SERPL W P-5'-P-CCNC: 18 U/L (ref 7–52)
ANION GAP SERPL CALCULATED.3IONS-SCNC: 8 MMOL/L (ref 4–13)
AST SERPL W P-5'-P-CCNC: 20 U/L (ref 13–39)
BACTERIA UR QL AUTO: NORMAL /HPF
BASOPHILS # BLD AUTO: 0.05 THOUSANDS/ÂΜL (ref 0–0.1)
BASOPHILS NFR BLD AUTO: 1 % (ref 0–1)
BILIRUB SERPL-MCNC: 0.28 MG/DL (ref 0.2–1)
BILIRUB UR QL STRIP: NEGATIVE
BUN SERPL-MCNC: 13 MG/DL (ref 5–25)
CALCIUM SERPL-MCNC: 9.3 MG/DL (ref 8.4–10.2)
CHLORIDE SERPL-SCNC: 101 MMOL/L (ref 96–108)
CHOLEST SERPL-MCNC: 150 MG/DL (ref ?–200)
CLARITY UR: NORMAL
CO2 SERPL-SCNC: 25 MMOL/L (ref 21–32)
COLOR UR: YELLOW
CREAT SERPL-MCNC: 0.48 MG/DL (ref 0.6–1.3)
CREAT UR-MCNC: 97.5 MG/DL
EOSINOPHIL # BLD AUTO: 0.17 THOUSAND/ÂΜL (ref 0–0.61)
EOSINOPHIL NFR BLD AUTO: 3 % (ref 0–6)
ERYTHROCYTE [DISTWIDTH] IN BLOOD BY AUTOMATED COUNT: 13.6 % (ref 11.6–15.1)
EST. AVERAGE GLUCOSE BLD GHB EST-MCNC: 120 MG/DL
GFR SERPL CREATININE-BSD FRML MDRD: 124 ML/MIN/1.73SQ M
GLUCOSE P FAST SERPL-MCNC: 87 MG/DL (ref 65–99)
GLUCOSE UR STRIP-MCNC: NEGATIVE MG/DL
HBA1C MFR BLD: 5.8 %
HCT VFR BLD AUTO: 38 % (ref 36.5–49.3)
HDLC SERPL-MCNC: 70 MG/DL
HGB BLD-MCNC: 12.9 G/DL (ref 12–17)
HGB UR QL STRIP.AUTO: NEGATIVE
IMM GRANULOCYTES # BLD AUTO: 0.01 THOUSAND/UL (ref 0–0.2)
IMM GRANULOCYTES NFR BLD AUTO: 0 % (ref 0–2)
KETONES UR STRIP-MCNC: NEGATIVE MG/DL
LDLC SERPL CALC-MCNC: 71 MG/DL (ref 0–100)
LEUKOCYTE ESTERASE UR QL STRIP: NEGATIVE
LYMPHOCYTES # BLD AUTO: 1.6 THOUSANDS/ÂΜL (ref 0.6–4.47)
LYMPHOCYTES NFR BLD AUTO: 23 % (ref 14–44)
MCH RBC QN AUTO: 31.3 PG (ref 26.8–34.3)
MCHC RBC AUTO-ENTMCNC: 33.9 G/DL (ref 31.4–37.4)
MCV RBC AUTO: 92 FL (ref 82–98)
MICROALBUMIN UR-MCNC: 10.3 MG/L
MICROALBUMIN/CREAT 24H UR: 11 MG/G CREATININE (ref 0–30)
MONOCYTES # BLD AUTO: 0.83 THOUSAND/ÂΜL (ref 0.17–1.22)
MONOCYTES NFR BLD AUTO: 12 % (ref 4–12)
MUCOUS THREADS UR QL AUTO: NORMAL
NEUTROPHILS # BLD AUTO: 4.27 THOUSANDS/ÂΜL (ref 1.85–7.62)
NEUTS SEG NFR BLD AUTO: 61 % (ref 43–75)
NITRITE UR QL STRIP: NEGATIVE
NON-SQ EPI CELLS URNS QL MICRO: NORMAL /HPF
NONHDLC SERPL-MCNC: 80 MG/DL
NRBC BLD AUTO-RTO: 0 /100 WBCS
PH UR STRIP.AUTO: 5.5 [PH]
PLATELET # BLD AUTO: 408 THOUSANDS/UL (ref 149–390)
PMV BLD AUTO: 8.9 FL (ref 8.9–12.7)
POTASSIUM SERPL-SCNC: 5.2 MMOL/L (ref 3.5–5.3)
PROT SERPL-MCNC: 7.4 G/DL (ref 6.4–8.4)
PROT UR STRIP-MCNC: NEGATIVE MG/DL
PSA SERPL-MCNC: 0.82 NG/ML (ref 0–4)
RBC # BLD AUTO: 4.12 MILLION/UL (ref 3.88–5.62)
RBC #/AREA URNS AUTO: NORMAL /HPF
SODIUM SERPL-SCNC: 134 MMOL/L (ref 135–147)
SP GR UR STRIP.AUTO: 1.02 (ref 1–1.03)
TRIGL SERPL-MCNC: 44 MG/DL (ref ?–150)
UROBILINOGEN UR STRIP-ACNC: <2 MG/DL
WBC # BLD AUTO: 6.93 THOUSAND/UL (ref 4.31–10.16)
WBC #/AREA URNS AUTO: NORMAL /HPF

## 2025-03-29 PROCEDURE — 85025 COMPLETE CBC W/AUTO DIFF WBC: CPT

## 2025-03-29 PROCEDURE — 83036 HEMOGLOBIN GLYCOSYLATED A1C: CPT

## 2025-03-29 PROCEDURE — 80061 LIPID PANEL: CPT

## 2025-03-29 PROCEDURE — 82570 ASSAY OF URINE CREATININE: CPT

## 2025-03-29 PROCEDURE — 36415 COLL VENOUS BLD VENIPUNCTURE: CPT

## 2025-03-29 PROCEDURE — 80053 COMPREHEN METABOLIC PANEL: CPT

## 2025-03-29 PROCEDURE — G0103 PSA SCREENING: HCPCS

## 2025-03-29 PROCEDURE — 82043 UR ALBUMIN QUANTITATIVE: CPT

## 2025-03-29 PROCEDURE — 81001 URINALYSIS AUTO W/SCOPE: CPT

## 2025-03-30 ENCOUNTER — RESULTS FOLLOW-UP (OUTPATIENT)
Age: 55
End: 2025-03-30

## 2025-03-31 ENCOUNTER — RESULTS FOLLOW-UP (OUTPATIENT)
Dept: FAMILY MEDICINE CLINIC | Facility: CLINIC | Age: 55
End: 2025-03-31

## 2025-04-02 ENCOUNTER — OFFICE VISIT (OUTPATIENT)
Dept: FAMILY MEDICINE CLINIC | Facility: CLINIC | Age: 55
End: 2025-04-02
Payer: COMMERCIAL

## 2025-04-02 VITALS
HEART RATE: 86 BPM | OXYGEN SATURATION: 97 % | DIASTOLIC BLOOD PRESSURE: 82 MMHG | TEMPERATURE: 98 F | SYSTOLIC BLOOD PRESSURE: 132 MMHG | BODY MASS INDEX: 22.95 KG/M2 | WEIGHT: 142.2 LBS

## 2025-04-02 DIAGNOSIS — I10 PRIMARY HYPERTENSION: Primary | ICD-10-CM

## 2025-04-02 DIAGNOSIS — M19.90 ARTHRITIS: ICD-10-CM

## 2025-04-02 DIAGNOSIS — E87.1 HYPONATREMIA: ICD-10-CM

## 2025-04-02 DIAGNOSIS — R73.03 PREDIABETES: ICD-10-CM

## 2025-04-02 PROCEDURE — 99214 OFFICE O/P EST MOD 30 MIN: CPT | Performed by: PHYSICIAN ASSISTANT

## 2025-04-02 RX ORDER — DICLOFENAC SODIUM 25 MG/1
25 TABLET, DELAYED RELEASE ORAL 2 TIMES DAILY PRN
Qty: 90 TABLET | Refills: 1 | Status: SHIPPED | OUTPATIENT
Start: 2025-04-02

## 2025-04-02 RX ORDER — LISINOPRIL 20 MG/1
20 TABLET ORAL DAILY
Qty: 90 TABLET | Refills: 2 | Status: SHIPPED | OUTPATIENT
Start: 2025-04-02

## 2025-04-02 NOTE — LETTER
April 2, 2025     Patient: Buzz Renteria  YOB: 1970  Date of Visit: 4/2/2025      To Whom it May Concern:    Buzz Renteria is under my professional care. Buzz was seen in my office on 4/2/2025. Buzz may return to work on 4/2/2025 .    If you have any questions or concerns, please don't hesitate to call.         Sincerely,          Layla Trujillo PA-C

## 2025-04-02 NOTE — ASSESSMENT & PLAN NOTE
Reviewed labs with patient.  A1c is stable at 5.7.  Recommended that he try to follow a low-carb diet.  Will recheck labs in 6 months.  Orders:  •  Hemoglobin A1C; Future

## 2025-04-02 NOTE — ASSESSMENT & PLAN NOTE
Blood pressure did improve on recheck.  Patient is getting pressures at home that are averaging in the 110s systolic and 70s diastolic.  He admits that at times he is feeling fatigued and lightheaded.  We will decrease dose of lisinopril to 20 mg daily.  He will continue to check blood pressures at home.  He will notify us if blood pressures are elevated after decreasing dose.    Orders:  •  Comprehensive metabolic panel; Future  •  CBC and differential; Future  •  lisinopril (ZESTRIL) 20 mg tablet; Take 1 tablet (20 mg total) by mouth daily

## 2025-04-02 NOTE — PROGRESS NOTES
Name: Buzz Renteria      : 1970      MRN: [id number]  Encounter Provider: Layla Trujillo PA-C  Encounter Date: 2025   Encounter department: [facility]  :  Assessment & Plan  Primary hypertension  Blood pressure did improve on recheck.  Patient is getting pressures at home that are averaging in the 110s systolic and 70s diastolic.  He admits that at times he is feeling fatigued and lightheaded.  We will decrease dose of lisinopril to 20 mg daily.  He will continue to check blood pressures at home.  He will notify us if blood pressures are elevated after decreasing dose.    Orders:  •  Comprehensive metabolic panel; Future  •  CBC and differential; Future  •  lisinopril (ZESTRIL) 20 mg tablet; Take 1 tablet (20 mg total) by mouth daily    Hyponatremia  Reviewed labs with patient.  Sodium level is stable.  Continue to follow with nephrology.  Continue with fluid restriction.  Orders:  •  Comprehensive metabolic panel; Future    Prediabetes  Reviewed labs with patient.  A1c is stable at 5.7.  Recommended that he try to follow a low-carb diet.  Will recheck labs in 6 months.  Orders:  •  Hemoglobin A1C; Future    Arthritis  Prescription for Voltaren.  He may use as needed for arthralgias.  Orders:  •  diclofenac sodium (VOLTAREN) 25 MG EC tablet; Take 1 tablet (25 mg total) by mouth 2 (two) times a day as needed (pain)           History of Present Illness   Buzz is a very pleasant 54-year-old male who is here today for a routine follow-up.  He admits that he is doing okay on his lisinopril.  He admits that at times he feels that his blood pressure is too low.  He has episodes of dizziness and lightheadedness, especially upon changing positions.  He admits that his systolic pressures have been averaging in the 110s and his diastolic pressures have been averaging in the 70s.  On Thursday his blood pressure at home was 116/78.  He continues to follow with nephrology.  He admits that he is still  following his fluid restriction.  He is asking for refill on Voltaren.  He only takes this sparingly as needed for arthritis if he has a difficulty at work.  He denies any other acute concerns or problems.      Review of Systems   Constitutional:  Negative for chills, diaphoresis, fatigue and fever.   HENT:  Negative for congestion, ear pain, postnasal drip, rhinorrhea, sneezing, sore throat and trouble swallowing.    Eyes:  Negative for pain and visual disturbance.   Respiratory:  Negative for apnea, cough, shortness of breath and wheezing.    Cardiovascular:  Negative for chest pain and palpitations.   Gastrointestinal:  Negative for abdominal pain, constipation, diarrhea, nausea and vomiting.   Genitourinary:  Negative for dysuria and hematuria.   Musculoskeletal:  Positive for arthralgias. Negative for gait problem and myalgias.   Neurological:  Positive for light-headedness. Negative for dizziness, syncope, weakness, numbness and headaches.   Psychiatric/Behavioral:  Negative for suicidal ideas. The patient is not nervous/anxious.        Objective   /82   Pulse 86   Temp 98 °F (36.7 °C)   Wt 64.5 kg (142 lb 3.2 oz)   SpO2 97%   BMI 22.95 kg/m²      Physical Exam  Constitutional:       Appearance: He is well-developed.   HENT:      Head: Normocephalic and atraumatic.      Right Ear: External ear normal.      Left Ear: External ear normal.      Nose: Nose normal.      Mouth/Throat:      Pharynx: No oropharyngeal exudate or posterior oropharyngeal erythema.   Eyes:      Extraocular Movements: Extraocular movements intact.   Cardiovascular:      Rate and Rhythm: Normal rate and regular rhythm.      Heart sounds: Normal heart sounds. No murmur heard.     No friction rub. No gallop.   Pulmonary:      Effort: Pulmonary effort is normal. No respiratory distress.      Breath sounds: Normal breath sounds. No wheezing or rales.   Musculoskeletal:         General: Normal range of motion.      Cervical back:  Normal range of motion and neck supple.   Skin:     General: Skin is warm and dry.   Neurological:      Mental Status: He is alert and oriented to person, place, and time.   Psychiatric:         Behavior: Behavior normal.         Thought Content: Thought content normal.         Judgment: Judgment normal.

## 2025-04-02 NOTE — ASSESSMENT & PLAN NOTE
Reviewed labs with patient.  Sodium level is stable.  Continue to follow with nephrology.  Continue with fluid restriction.  Orders:  •  Comprehensive metabolic panel; Future

## 2025-05-13 DIAGNOSIS — I10 PRIMARY HYPERTENSION: Primary | ICD-10-CM

## 2025-05-24 ENCOUNTER — APPOINTMENT (OUTPATIENT)
Dept: LAB | Facility: MEDICAL CENTER | Age: 55
End: 2025-05-24
Attending: INTERNAL MEDICINE
Payer: COMMERCIAL

## 2025-05-24 DIAGNOSIS — I10 PRIMARY HYPERTENSION: ICD-10-CM

## 2025-05-24 LAB
ANION GAP SERPL CALCULATED.3IONS-SCNC: 12 MMOL/L (ref 4–13)
BASOPHILS # BLD AUTO: 0.06 THOUSANDS/ÂΜL (ref 0–0.1)
BASOPHILS NFR BLD AUTO: 1 % (ref 0–1)
BUN SERPL-MCNC: 8 MG/DL (ref 5–25)
CALCIUM SERPL-MCNC: 8.9 MG/DL (ref 8.4–10.2)
CHLORIDE SERPL-SCNC: 99 MMOL/L (ref 96–108)
CO2 SERPL-SCNC: 23 MMOL/L (ref 21–32)
CREAT SERPL-MCNC: 0.49 MG/DL (ref 0.6–1.3)
EOSINOPHIL # BLD AUTO: 0.18 THOUSAND/ÂΜL (ref 0–0.61)
EOSINOPHIL NFR BLD AUTO: 2 % (ref 0–6)
ERYTHROCYTE [DISTWIDTH] IN BLOOD BY AUTOMATED COUNT: 13.4 % (ref 11.6–15.1)
GFR SERPL CREATININE-BSD FRML MDRD: 123 ML/MIN/1.73SQ M
GLUCOSE P FAST SERPL-MCNC: 77 MG/DL (ref 65–99)
HCT VFR BLD AUTO: 37.5 % (ref 36.5–49.3)
HGB BLD-MCNC: 12.4 G/DL (ref 12–17)
IMM GRANULOCYTES # BLD AUTO: 0.04 THOUSAND/UL (ref 0–0.2)
IMM GRANULOCYTES NFR BLD AUTO: 0 % (ref 0–2)
LYMPHOCYTES # BLD AUTO: 1.86 THOUSANDS/ÂΜL (ref 0.6–4.47)
LYMPHOCYTES NFR BLD AUTO: 21 % (ref 14–44)
MCH RBC QN AUTO: 30.5 PG (ref 26.8–34.3)
MCHC RBC AUTO-ENTMCNC: 33.1 G/DL (ref 31.4–37.4)
MCV RBC AUTO: 92 FL (ref 82–98)
MONOCYTES # BLD AUTO: 1.04 THOUSAND/ÂΜL (ref 0.17–1.22)
MONOCYTES NFR BLD AUTO: 12 % (ref 4–12)
NEUTROPHILS # BLD AUTO: 5.83 THOUSANDS/ÂΜL (ref 1.85–7.62)
NEUTS SEG NFR BLD AUTO: 64 % (ref 43–75)
NRBC BLD AUTO-RTO: 0 /100 WBCS
PLATELET # BLD AUTO: 470 THOUSANDS/UL (ref 149–390)
PMV BLD AUTO: 8.6 FL (ref 8.9–12.7)
POTASSIUM SERPL-SCNC: 4.7 MMOL/L (ref 3.5–5.3)
RBC # BLD AUTO: 4.06 MILLION/UL (ref 3.88–5.62)
SODIUM SERPL-SCNC: 134 MMOL/L (ref 135–147)
WBC # BLD AUTO: 9.01 THOUSAND/UL (ref 4.31–10.16)

## 2025-05-24 PROCEDURE — 80048 BASIC METABOLIC PNL TOTAL CA: CPT

## 2025-05-24 PROCEDURE — 36415 COLL VENOUS BLD VENIPUNCTURE: CPT

## 2025-05-24 PROCEDURE — 85025 COMPLETE CBC W/AUTO DIFF WBC: CPT

## 2025-06-02 ENCOUNTER — OFFICE VISIT (OUTPATIENT)
Dept: NEPHROLOGY | Facility: CLINIC | Age: 55
End: 2025-06-02
Payer: COMMERCIAL

## 2025-06-02 VITALS
WEIGHT: 139.8 LBS | SYSTOLIC BLOOD PRESSURE: 153 MMHG | RESPIRATION RATE: 16 BRPM | OXYGEN SATURATION: 98 % | HEIGHT: 66 IN | DIASTOLIC BLOOD PRESSURE: 83 MMHG | HEART RATE: 91 BPM | BODY MASS INDEX: 22.47 KG/M2 | TEMPERATURE: 98.2 F

## 2025-06-02 DIAGNOSIS — E87.1 HYPONATREMIA: Primary | ICD-10-CM

## 2025-06-02 DIAGNOSIS — F10.29 ALCOHOL DEPENDENCE WITH UNSPECIFIED ALCOHOL-INDUCED DISORDER (HCC): ICD-10-CM

## 2025-06-02 DIAGNOSIS — Z72.0 TOBACCO ABUSE: ICD-10-CM

## 2025-06-02 DIAGNOSIS — I10 PRIMARY HYPERTENSION: ICD-10-CM

## 2025-06-02 PROCEDURE — 99214 OFFICE O/P EST MOD 30 MIN: CPT | Performed by: INTERNAL MEDICINE

## 2025-06-02 NOTE — PROGRESS NOTES
Assessment & Plan:    1. Hyponatremia  -     Urinalysis with microscopic; Future; Expected date: 11/04/2025  -     Magnesium; Future; Expected date: 11/04/2025  -     Phosphorus; Future; Expected date: 11/04/2025  -     Comprehensive metabolic panel; Future; Expected date: 11/04/2025  2. Primary hypertension  -     Comprehensive metabolic panel; Future; Expected date: 11/04/2025  3. Tobacco abuse  4. Alcohol dependence with unspecified alcohol-induced disorder (HCC)           Hyponatremia  5/24/25 Most recent Na trend reassuring at 134.  Recent trend 131-134, mildly low.  Given stability, can hold on reordering workup including urine studies.     5/25/24 Urine osm 314. Urine sodium 51.     Previous consideration for insufficient solute and beer potomania. Lisinopril and Diclofenac can be associated with hyponatremia as well through [facility].     Encourage protein intake as patient is doing.  Encourage reduction in alcohol intake.  Fu labs in 6 months given stability.   Keep fluid intake around 2 quart per day.   No need for salt supplement. Maintain 2-3 gram Na diet.    2. Primary HTN  In office BP elevated but home trends are reassuring, /70-80. Suspect previous BP was overly well controlled.   Continue current lisinopril dose --20mg/day.   Volume status euvolemic.     3. Tobacco abuse  Not ready to quit, continue working with PCP for cessation.    4. Alcohol use  Continues to drink 6 pack of beer regularly, advised to cut back. Recommendation in men is to limit to 2 alcoholic beverages per day.       The benefits, risks and alternatives to the treatment plan were discussed at this visit. Patient was advised of common adverse effects of any medical therapies prescribed. All questions were answered and discussed with the patient and any accompanying family members or caretakers.      Subjective:      Patient ID: Buzz Renteria is a 54 y.o. male presents ffor follow up in the [facility] for HTN and hyponatremia  "management. Patient last seen 9/30/24 and advised to limit fluid to less than 2 L at that time. He is on lisinopril     HPI    In the interim since last visit, lisinopril dose reduced from 30->20mg/day by PCP for concern of dizziness and low BP. Since dose reduction, dizziness has improved. Home BP appears well controled --130/70-80.    Takes diclofenac 25 mg once or twice am .   / 70-80 .  Drinking premier shakes as needed,.     Na 134 on 5/24/25.  Tried to cut down on alcohol but still drinking a 6 pack of beer regularly.       The following portions of the patient's history were reviewed and updated as appropriate: allergies, current medications, past family history, past medical history, past social history, past surgical history, and problem list.    Review of Systems   Respiratory: Negative.     Cardiovascular: Negative.    Gastrointestinal: Negative.    Genitourinary: Negative.    Neurological: Negative.    All other systems reviewed and are negative.        Objective:      /83 (Patient Position: Sitting, Cuff Size: Standard)   Pulse 91   Temp 98.2 °F (36.8 °C) (Temporal)   Resp 16   Ht 5' 6\" (1.676 m)   Wt 63.4 kg (139 lb 12.8 oz)   SpO2 98%   BMI 22.56 kg/m²          Physical Exam  Vitals reviewed.   Constitutional:       General: He is not in acute distress.     Appearance: He is not ill-appearing.   HENT:      Head: Normocephalic and atraumatic.      Nose: Nose normal.      Mouth/Throat:      Pharynx: No oropharyngeal exudate.     Eyes:      Extraocular Movements: Extraocular movements intact.      Conjunctiva/sclera: Conjunctivae normal.       Cardiovascular:      Rate and Rhythm: Normal rate and regular rhythm.      Heart sounds:      No friction rub.   Pulmonary:      Breath sounds: Wheezing present. No rales.   Abdominal:      Tenderness: There is no abdominal tenderness. There is no guarding.     Musculoskeletal:      Right lower leg: No edema.      Left lower leg: No edema. " "    Skin:     General: Skin is warm and dry.      Findings: No bruising.     Neurological:      General: No focal deficit present.      Mental Status: He is alert and oriented to person, place, and time. Mental status is at baseline.      Cranial Nerves: No cranial nerve deficit.     Psychiatric:         Mood and Affect: Mood normal.         Behavior: Behavior normal.             Lab Results   Component Value Date    SODIUM 134 (L) 05/24/2025    K 4.7 05/24/2025    CL 99 05/24/2025    CO2 23 05/24/2025    AGAP 12 05/24/2025    BUN 8 05/24/2025    CREATININE 0.49 (L) 05/24/2025    GLUC 160 (H) 10/30/2023    GLUF 77 05/24/2025    CALCIUM 8.9 05/24/2025    AST 20 03/29/2025    ALT 18 03/29/2025    ALKPHOS 88 03/29/2025    TP 7.4 03/29/2025    TBILI 0.28 03/29/2025    EGFR 123 05/24/2025      Lab Results   Component Value Date    CREATININE 0.49 (L) 05/24/2025    CREATININE 0.48 (L) 03/29/2025    CREATININE 0.57 (L) 09/21/2024    CREATININE 0.54 (L) 05/25/2024    CREATININE 0.54 (L) 03/23/2024    CREATININE 0.56 (L) 02/17/2024    CREATININE 0.56 (L) 11/18/2023    CREATININE 0.82 10/30/2023    CREATININE 0.59 (L) 02/01/2023      Lab Results   Component Value Date    COLORU Yellow 03/29/2025    CLARITYU Extra Turbid 03/29/2025    SPECGRAV 1.017 03/29/2025    PHUR 5.5 03/29/2025    LEUKOCYTESUR Negative 03/29/2025    NITRITE Negative 03/29/2025    PROTEIN UA Negative 03/29/2025    GLUCOSEU Negative 03/29/2025    KETONESU Negative 03/29/2025    UROBILINOGEN <2.0 03/29/2025    BILIRUBINUR Negative 03/29/2025    BLOODU Negative 03/29/2025    RBCUA 1-2 03/29/2025    WBCUA None Seen 03/29/2025    EPIS None Seen 03/29/2025    BACTERIA Occasional 03/29/2025      No results found for: \"LABPROT\"  No results found for: \"MICROALBUR\", \"LEPU53HIK\"  Lab Results   Component Value Date    WBC 9.01 05/24/2025    HGB 12.4 05/24/2025    HCT 37.5 05/24/2025    MCV 92 05/24/2025     (H) 05/24/2025      Lab Results   Component Value Date " "   HGB 12.4 05/24/2025    HGB 12.9 03/29/2025    HGB 13.5 02/17/2024    HGB 14.8 11/18/2023    HGB 16.1 10/30/2023      No results found for: \"IRON\", \"TIBC\", \"FERRITIN\"   No results found for: \"PTHCALCIUM\", \"URSY38SIZPJV\", \"PHOSPHORUS\"   Lab Results   Component Value Date    CHOLESTEROL 150 03/29/2025    HDL 70 03/29/2025    LDLCALC 71 03/29/2025    TRIG 44 03/29/2025      No results found for: \"URICACID\"   Lab Results   Component Value Date    HGBA1C 5.8 (H) 03/29/2025      No results found for: \"TSHANTIBODY\", \"L1FUNXX\", \"FREET4\"   No results found for: \"PIA\", \"DSDNAAB\", \"RFIGM\"   No results found for: \"PROT\", \"UPEP\", \"IMMUNOFIX\", \"KAPPALAMBDA\", \"KAPPALIGHT\"     Portions of the record may have been created with voice recognition software. Occasional wrong word or \"sound a like\" substitutions may have occurred due to the inherent limitations of voice recognition software. Read the chart carefully and recognize, using context, where substitutions have occurred. If you have any questions, please contact the dictating provider.  "

## 2025-06-02 NOTE — PATIENT INSTRUCTIONS
Your sodium level is essentially normal at 134 and has been normal on 2 occasions.  Check blood work in November.   Advise to reduce alcohol intake, no more than 2 drinks a day  Advise to quit smoking.